# Patient Record
Sex: FEMALE | Race: WHITE | Employment: OTHER | URBAN - METROPOLITAN AREA
[De-identification: names, ages, dates, MRNs, and addresses within clinical notes are randomized per-mention and may not be internally consistent; named-entity substitution may affect disease eponyms.]

---

## 2021-12-25 ENCOUNTER — HOSPITAL ENCOUNTER (INPATIENT)
Age: 86
LOS: 4 days | Discharge: NURSING, REHAB OR LONG-TERM CARE | DRG: 312 | End: 2021-12-29
Attending: EMERGENCY MEDICINE | Admitting: INTERNAL MEDICINE
Payer: MEDICARE

## 2021-12-25 ENCOUNTER — APPOINTMENT (OUTPATIENT)
Dept: CT IMAGING | Age: 86
DRG: 312 | End: 2021-12-25
Payer: MEDICARE

## 2021-12-25 DIAGNOSIS — R55 SYNCOPE AND COLLAPSE: Primary | ICD-10-CM

## 2021-12-25 LAB
A/G RATIO: 1.4 (ref 1.1–2.2)
ALBUMIN SERPL-MCNC: 4.2 G/DL (ref 3.4–5)
ALP BLD-CCNC: 78 U/L (ref 40–129)
ALT SERPL-CCNC: 10 U/L (ref 10–40)
ANION GAP SERPL CALCULATED.3IONS-SCNC: 13 MMOL/L (ref 3–16)
AST SERPL-CCNC: 17 U/L (ref 15–37)
BASOPHILS ABSOLUTE: 0 K/UL (ref 0–0.2)
BASOPHILS RELATIVE PERCENT: 0.9 %
BILIRUB SERPL-MCNC: 0.3 MG/DL (ref 0–1)
BILIRUBIN URINE: NEGATIVE
BLOOD, URINE: NEGATIVE
BUN BLDV-MCNC: 20 MG/DL (ref 7–20)
CALCIUM SERPL-MCNC: 9.4 MG/DL (ref 8.3–10.6)
CHLORIDE BLD-SCNC: 94 MMOL/L (ref 99–110)
CLARITY: CLEAR
CO2: 24 MMOL/L (ref 21–32)
COLOR: YELLOW
CREAT SERPL-MCNC: 1.4 MG/DL (ref 0.6–1.2)
EOSINOPHILS ABSOLUTE: 0 K/UL (ref 0–0.6)
EOSINOPHILS RELATIVE PERCENT: 0.5 %
GFR AFRICAN AMERICAN: 42
GFR NON-AFRICAN AMERICAN: 35
GLUCOSE BLD-MCNC: 113 MG/DL (ref 70–99)
GLUCOSE URINE: NEGATIVE MG/DL
HCT VFR BLD CALC: 32.7 % (ref 36–48)
HEMOGLOBIN: 11 G/DL (ref 12–16)
KETONES, URINE: NEGATIVE MG/DL
LACTIC ACID, SEPSIS: 1.9 MMOL/L (ref 0.4–1.9)
LEUKOCYTE ESTERASE, URINE: NEGATIVE
LIPASE: 29 U/L (ref 13–60)
LYMPHOCYTES ABSOLUTE: 1.6 K/UL (ref 1–5.1)
LYMPHOCYTES RELATIVE PERCENT: 30.4 %
MCH RBC QN AUTO: 32.4 PG (ref 26–34)
MCHC RBC AUTO-ENTMCNC: 33.5 G/DL (ref 31–36)
MCV RBC AUTO: 96.6 FL (ref 80–100)
MICROSCOPIC EXAMINATION: NORMAL
MONOCYTES ABSOLUTE: 0.8 K/UL (ref 0–1.3)
MONOCYTES RELATIVE PERCENT: 15.2 %
NEUTROPHILS ABSOLUTE: 2.7 K/UL (ref 1.7–7.7)
NEUTROPHILS RELATIVE PERCENT: 53 %
NITRITE, URINE: NEGATIVE
PDW BLD-RTO: 12.6 % (ref 12.4–15.4)
PH UA: 6 (ref 5–8)
PLATELET # BLD: 314 K/UL (ref 135–450)
PMV BLD AUTO: 7.3 FL (ref 5–10.5)
POTASSIUM REFLEX MAGNESIUM: 4.2 MMOL/L (ref 3.5–5.1)
PROTEIN UA: NEGATIVE MG/DL
RBC # BLD: 3.39 M/UL (ref 4–5.2)
SARS-COV-2, NAAT: DETECTED
SODIUM BLD-SCNC: 131 MMOL/L (ref 136–145)
SPECIFIC GRAVITY UA: 1.01 (ref 1–1.03)
TOTAL PROTEIN: 7.2 G/DL (ref 6.4–8.2)
TROPONIN: 0.04 NG/ML
URINE REFLEX TO CULTURE: NORMAL
URINE TYPE: NORMAL
UROBILINOGEN, URINE: 0.2 E.U./DL
WBC # BLD: 5.1 K/UL (ref 4–11)

## 2021-12-25 PROCEDURE — 96374 THER/PROPH/DIAG INJ IV PUSH: CPT

## 2021-12-25 PROCEDURE — 36415 COLL VENOUS BLD VENIPUNCTURE: CPT

## 2021-12-25 PROCEDURE — 83605 ASSAY OF LACTIC ACID: CPT

## 2021-12-25 PROCEDURE — 2700000000 HC OXYGEN THERAPY PER DAY

## 2021-12-25 PROCEDURE — 1200000000 HC SEMI PRIVATE

## 2021-12-25 PROCEDURE — 94761 N-INVAS EAR/PLS OXIMETRY MLT: CPT

## 2021-12-25 PROCEDURE — 6360000002 HC RX W HCPCS: Performed by: EMERGENCY MEDICINE

## 2021-12-25 PROCEDURE — 83690 ASSAY OF LIPASE: CPT

## 2021-12-25 PROCEDURE — 84484 ASSAY OF TROPONIN QUANT: CPT

## 2021-12-25 PROCEDURE — 87635 SARS-COV-2 COVID-19 AMP PRB: CPT

## 2021-12-25 PROCEDURE — 6360000002 HC RX W HCPCS: Performed by: INTERNAL MEDICINE

## 2021-12-25 PROCEDURE — 81003 URINALYSIS AUTO W/O SCOPE: CPT

## 2021-12-25 PROCEDURE — 93005 ELECTROCARDIOGRAM TRACING: CPT | Performed by: EMERGENCY MEDICINE

## 2021-12-25 PROCEDURE — 74176 CT ABD & PELVIS W/O CONTRAST: CPT

## 2021-12-25 PROCEDURE — 85025 COMPLETE CBC W/AUTO DIFF WBC: CPT

## 2021-12-25 PROCEDURE — 80053 COMPREHEN METABOLIC PANEL: CPT

## 2021-12-25 PROCEDURE — 99283 EMERGENCY DEPT VISIT LOW MDM: CPT

## 2021-12-25 PROCEDURE — 2580000003 HC RX 258: Performed by: EMERGENCY MEDICINE

## 2021-12-25 PROCEDURE — 2580000003 HC RX 258: Performed by: INTERNAL MEDICINE

## 2021-12-25 RX ORDER — POLYETHYLENE GLYCOL 3350 17 G/17G
17 POWDER, FOR SOLUTION ORAL DAILY PRN
Status: DISCONTINUED | OUTPATIENT
Start: 2021-12-25 | End: 2021-12-29 | Stop reason: HOSPADM

## 2021-12-25 RX ORDER — ONDANSETRON 2 MG/ML
4 INJECTION INTRAMUSCULAR; INTRAVENOUS ONCE
Status: COMPLETED | OUTPATIENT
Start: 2021-12-25 | End: 2021-12-25

## 2021-12-25 RX ORDER — SODIUM CHLORIDE 9 MG/ML
25 INJECTION, SOLUTION INTRAVENOUS PRN
Status: DISCONTINUED | OUTPATIENT
Start: 2021-12-25 | End: 2021-12-29 | Stop reason: HOSPADM

## 2021-12-25 RX ORDER — ACETAMINOPHEN 650 MG/1
650 SUPPOSITORY RECTAL EVERY 6 HOURS PRN
Status: DISCONTINUED | OUTPATIENT
Start: 2021-12-25 | End: 2021-12-29 | Stop reason: HOSPADM

## 2021-12-25 RX ORDER — ONDANSETRON 2 MG/ML
4 INJECTION INTRAMUSCULAR; INTRAVENOUS EVERY 6 HOURS PRN
Status: DISCONTINUED | OUTPATIENT
Start: 2021-12-25 | End: 2021-12-29 | Stop reason: HOSPADM

## 2021-12-25 RX ORDER — ACETAMINOPHEN 325 MG/1
650 TABLET ORAL EVERY 6 HOURS PRN
Status: DISCONTINUED | OUTPATIENT
Start: 2021-12-25 | End: 2021-12-29 | Stop reason: HOSPADM

## 2021-12-25 RX ORDER — LORAZEPAM 2 MG/ML
0.5 INJECTION INTRAMUSCULAR ONCE
Status: COMPLETED | OUTPATIENT
Start: 2021-12-25 | End: 2021-12-25

## 2021-12-25 RX ORDER — SODIUM CHLORIDE 9 MG/ML
INJECTION, SOLUTION INTRAVENOUS CONTINUOUS
Status: ACTIVE | OUTPATIENT
Start: 2021-12-25 | End: 2021-12-27

## 2021-12-25 RX ORDER — SODIUM CHLORIDE 0.9 % (FLUSH) 0.9 %
5-40 SYRINGE (ML) INJECTION PRN
Status: DISCONTINUED | OUTPATIENT
Start: 2021-12-25 | End: 2021-12-29 | Stop reason: HOSPADM

## 2021-12-25 RX ORDER — ONDANSETRON 4 MG/1
4 TABLET, ORALLY DISINTEGRATING ORAL EVERY 8 HOURS PRN
Status: DISCONTINUED | OUTPATIENT
Start: 2021-12-25 | End: 2021-12-29 | Stop reason: HOSPADM

## 2021-12-25 RX ORDER — SODIUM CHLORIDE 0.9 % (FLUSH) 0.9 %
5-40 SYRINGE (ML) INJECTION EVERY 12 HOURS SCHEDULED
Status: DISCONTINUED | OUTPATIENT
Start: 2021-12-25 | End: 2021-12-29 | Stop reason: HOSPADM

## 2021-12-25 RX ORDER — 0.9 % SODIUM CHLORIDE 0.9 %
250 INTRAVENOUS SOLUTION INTRAVENOUS ONCE
Status: COMPLETED | OUTPATIENT
Start: 2021-12-25 | End: 2021-12-25

## 2021-12-25 RX ADMIN — LORAZEPAM 0.5 MG: 2 INJECTION INTRAMUSCULAR; INTRAVENOUS at 18:38

## 2021-12-25 RX ADMIN — SODIUM CHLORIDE 250 ML: 9 INJECTION, SOLUTION INTRAVENOUS at 14:31

## 2021-12-25 RX ADMIN — SODIUM CHLORIDE: 9 INJECTION, SOLUTION INTRAVENOUS at 18:42

## 2021-12-25 RX ADMIN — ONDANSETRON 4 MG: 2 INJECTION INTRAMUSCULAR; INTRAVENOUS at 14:42

## 2021-12-25 ASSESSMENT — PAIN SCALES - PAIN ASSESSMENT IN ADVANCED DEMENTIA (PAINAD)
NEGVOCALIZATION: 0
FACIALEXPRESSION: 0
BREATHING: 0
CONSOLABILITY: 0
TOTALSCORE: 0
BODYLANGUAGE: 0

## 2021-12-25 ASSESSMENT — PAIN SCALES - GENERAL
PAINLEVEL_OUTOF10: 0
PAINLEVEL_OUTOF10: 0

## 2021-12-25 ASSESSMENT — PAIN DESCRIPTION - PAIN TYPE: TYPE: ACUTE PAIN;CHRONIC PAIN

## 2021-12-25 ASSESSMENT — ENCOUNTER SYMPTOMS
NAUSEA: 1
VOMITING: 1

## 2021-12-25 NOTE — ED NOTES
Pt resting in bed at this time, laying in a supine position with head of bed elevated . Call light remains in reach instructed pt how to use, and encouraged pt to call if needed assistance, no distress noted. RR even and unlabored, skin warm and dry. No needs at this time. Will continue to monitor closely.          Muna Bella RN  12/25/21 1640

## 2021-12-25 NOTE — ED NOTES
Bed: A-16  Expected date:   Expected time:   Means of arrival: [facility]  Comments:  Syncope     Sherren Garb, RN  12/25/21 4984

## 2021-12-25 NOTE — ED NOTES
Pt resting in bed at this time, laying in a supine position with head of bed elevated . Call light remains in reach instructed pt how to use, and encouraged pt to call if needed assistance, no distress noted. RR even and unlabored pt desat to 87%, 3 liters nasal cannula placed on pt, ed md aware  skin warm and dry. No needs at this time. Will continue to monitor closely.          Yisel Carver RN  12/25/21 2029

## 2021-12-25 NOTE — ED NOTES
Pt arrived to the ED via EMS, pt was supine on stretcher with head elevated alert, pt responds to voice, pt was transferred to stretcher in room 16, pt placed on cardiac monitor, Dr Vicente Tadeo at bedside, per ems nursing home nurse stated that pt had a syncopal episode on the commode, pt had bm and vomit pt was cleansed vss afebrile,       Gabby Turner RN  12/25/21 8057

## 2021-12-25 NOTE — PROGRESS NOTES
Pt bed alarm going off, pt found standing aat side of the bed, pt is disoriented x4 at present. Pt unable to follow commands, RN attempted explaining the pt about her situation and needed to babe in the hospital. Pt just wound not listen and gets feisty. It took 30 mins and three nurses to get pt back in bed. Darcie care provided. Pt repositioned in bed. Spoke with pt's daughter  at [phone], per pt's daughter she takes ativan scheduled at nursing home and as needed. Pt family wants pt to have her ativan.  Will notify MD. Electronically signed by Jelani Babin RN on 12/25/2021 at 6:30 PM

## 2021-12-25 NOTE — ED NOTES
Report called to  [address] [place] [address]      RN   To Room   [id number]  Cardiac monitor on during transfer  Pt's pain level   denies  VSS, Afebrile   IV site is clean, dry and intact, Normal saline locked   Family updated on transfer            Nicolle Manzo RN  12/25/21 0842

## 2021-12-25 NOTE — PROGRESS NOTES
Pt arrived in the room from ED at 17:15, pt is disoriented x4. Pt wanted to be left alone. Pt is not cooperative for nurse to let do anything at present. RN was able to get vital signs after few mins. Pt resting in bed. Pt wanted to left alone. Pt in bed. Bed alarm on. Call light and item need in reach.  Electronically signed by Katherine Hoang RN on 12/25/2021 at 6:22 PM

## 2021-12-25 NOTE — ED PROVIDER NOTES
[facility]  eMERGENCY dEPARTMENT eNCOUnter      Pt Name: Martin Anne  MRN: [id number]  [place] 9/26/1928  Date of evaluation: 12/25/2021  Provider: Kalli Rivas MD    CHIEF COMPLAINT       Chief Complaint   Patient presents with    Loss of Consciousness         HISTORY OF PRESENT ILLNESS   (Location/Symptom, Timing/Onset,Context/Setting, Quality, Duration, Modifying Factors, Severity)  Note limiting factors. Martin Anne is a 80 y.o. female who presents to the emergency department with syncope. This patient comes from a [facility] facility. She reportedly was having a bowel movement on the toilet when she had a syncopal episode. Her syncope lasted between 2 to 10 minutes. EMS reports that she had more vomiting and the regular blood pressure declined and they diverted from [address] [place] [address] to the emergency department here at [place].  The patient just states she feels nauseated. She is unable to provide other history. She does state that she has no pain anywhere. HPI    NursingNotes were reviewed. REVIEW OF SYSTEMS    (2-9 systems for level 4, 10 or more for level 5)     Review of Systems   Unable to perform ROS: Dementia   Gastrointestinal: Positive for nausea and vomiting. Except as noted above the remainder of the review of systems was reviewed and negative. PAST MEDICAL HISTORY   No past medical history on file. SURGICALHISTORY     No past surgical history on file. CURRENT MEDICATIONS       Previous Medications    No medications on file       ALLERGIES     Patient has no known allergies. FAMILY HISTORY     No family history on file.        SOCIAL HISTORY       Social History     Socioeconomic History    Marital status:      Spouse name: Not on file    Number of children: Not on file    Years of education: Not on file    Highest education level: Not on file   Occupational History    Not on file   Tobacco Use    Smoking status: Not on file    Smokeless tobacco: Not on file   Substance and Sexual Activity    Alcohol use: Not on file    Drug use: Not on file    Sexual activity: Not on file   Other Topics Concern    Not on file   Social History Narrative    Not on file     Social Determinants of Health     Financial Resource Strain:     Difficulty of Paying Living Expenses: Not on file   Food Insecurity:     Worried About Running Out of Food in the Last Year: Not on file    Keyla of Food in the Last Year: Not on file   Transportation Needs:     Lack of Transportation (Medical): Not on file    Lack of Transportation (Non-Medical): Not on file   Physical Activity:     Days of Exercise per Week: Not on file    Minutes of Exercise per Session: Not on file   Stress:     Feeling of Stress : Not on file   Social Connections:     Frequency of Communication with Friends and Family: Not on file    Frequency of Social Gatherings with Friends and Family: Not on file    Attends Mandaeism Services: Not on file    Active Member of [address] [place] [address] [employer] or Organizations: Not on file    Attends Club or Organization Meetings: Not on file    Marital Status: Not on file   Intimate Partner Violence:     Fear of Current or Ex-Partner: Not on file    Emotionally Abused: Not on file    Physically Abused: Not on file    Sexually Abused: Not on file   Housing Stability:     Unable to Pay for Housing in the Last Year: Not on file    Number of [place] in the Last Year: Not on file    Unstable Housing in the Last Year: Not on file       SCREENINGS             PHYSICAL EXAM    (up to 7 for level 4, 8 or more for level 5)     ED Triage Vitals [12/25/21 1426]   BP Temp Temp Source Pulse Resp SpO2 Height Weight   (!) 98/43 98 °F (36.7 °C) Oral 59 22 92 % -- --       Physical Exam  Vitals and nursing note reviewed. Constitutional:       Appearance: She is well-developed. She is not diaphoretic.       Comments: Pale elderly female in no acute distress. HENT:      Head: Normocephalic and atraumatic. Nose: Nose normal.      Mouth/Throat:      Mouth: Mucous membranes are moist.   Eyes:      General:         Right eye: No discharge. Left eye: No discharge. Conjunctiva/sclera: Conjunctivae normal.   Cardiovascular:      Rate and Rhythm: Regular rhythm. Pulses: Normal pulses. Pulmonary:      Effort: Pulmonary effort is normal. No respiratory distress. Abdominal:      Palpations: Abdomen is soft. Tenderness: There is no abdominal tenderness. Comments: No pulsatile masses   Musculoskeletal:         General: Normal range of motion. Cervical back: Neck supple. Skin:     General: Skin is warm and dry. Capillary Refill: Capillary refill takes less than 2 seconds. Neurological:      Mental Status: She is alert and oriented to person, place, and time. Psychiatric:         Behavior: Behavior normal.         DIAGNOSTIC RESULTS     EKG: All EKG's are interpreted by the Emergency Department Physician who either signs or Co-signsthis chart in the absence of a cardiologist.    The Ekg interpreted by me shows  normal sinus rhythm with a rate of 63  Axis is   Normal  QTc is  normal  Right bundle branch block. ST Segments: T wave inversion in leads V1, V2. Nonspecific ST-T wave changes. There are no EKG images available in our epic instance for comparison. According to care everywhere on March 24, 2021 the patient had a right bundle branch block noted on her EKG at that time.           RADIOLOGY:   Non-plain filmimages such as CT, Ultrasound and MRI are read by the radiologist. Plain radiographic images are visualized and preliminarily interpreted by the emergency physician with the below findings:        Interpretation per the Radiologist below, if available at the time ofthis note:    No orders to display         ED BEDSIDE ULTRASOUND:   Performed by ED Physician - none    LABS:  Labs Reviewed   CBC WITH AUTO DIFFERENTIAL   COMPREHENSIVE METABOLIC PANEL W/ REFLEX TO MG FOR LOW K   LIPASE   TROPONIN   URINE RT REFLEX TO CULTURE   LACTATE, SEPSIS   LACTATE, SEPSIS       All other labs were within normal range or not returned as of this dictation. EMERGENCY DEPARTMENT COURSE and DIFFERENTIAL DIAGNOSIS/MDM:   Vitals:    Vitals:    12/25/21 1426   BP: (!) 98/43   Pulse: 59   Resp: 22   Temp: 98 °F (36.7 °C)   TempSrc: Oral   SpO2: 92%         MDM  Number of Diagnoses or Management Options  Diagnosis management comments: DDX: Non-ST elevation MI, hypovolemia, dehydration, sepsis, other    At 3:00 PM I turned this patient over to Dr. Becca Means. CRITICAL CARE TIME   Total Critical Care time was 0 minutes, excluding separately reportable procedures. There was a high probability of clinically significant/life threatening deterioration in the patient's condition which required my urgent intervention. CONSULTS:  None    PROCEDURES:  Unless otherwise noted below, none     Procedures    FINAL IMPRESSION    No diagnosis found. DISPOSITION/PLAN   DISPOSITION        PATIENT REFERRED TO:  No follow-up provider specified.     DISCHARGE MEDICATIONS:  New Prescriptions    No medications on file          (Please note that portions of this note were completed with a voice recognition program.Efforts were made to edit the dictations but occasionally words are mis-transcribed.)    Travis Stephenson MD (electronically signed)  Attending Emergency Physician         Travis Stephenson MD  12/26/21 5534

## 2021-12-26 LAB
ANION GAP SERPL CALCULATED.3IONS-SCNC: 9 MMOL/L (ref 3–16)
BASOPHILS ABSOLUTE: 0 K/UL (ref 0–0.2)
BASOPHILS RELATIVE PERCENT: 0.7 %
BUN BLDV-MCNC: 16 MG/DL (ref 7–20)
CALCIUM SERPL-MCNC: 8.6 MG/DL (ref 8.3–10.6)
CHLORIDE BLD-SCNC: 102 MMOL/L (ref 99–110)
CO2: 24 MMOL/L (ref 21–32)
CREAT SERPL-MCNC: 0.9 MG/DL (ref 0.6–1.2)
EKG ATRIAL RATE: 63 BPM
EKG DIAGNOSIS: NORMAL
EKG P AXIS: 93 DEGREES
EKG P-R INTERVAL: 160 MS
EKG Q-T INTERVAL: 440 MS
EKG QRS DURATION: 114 MS
EKG QTC CALCULATION (BAZETT): 450 MS
EKG R AXIS: 79 DEGREES
EKG T AXIS: 68 DEGREES
EKG VENTRICULAR RATE: 63 BPM
EOSINOPHILS ABSOLUTE: 0 K/UL (ref 0–0.6)
EOSINOPHILS RELATIVE PERCENT: 0.2 %
GFR AFRICAN AMERICAN: >60
GFR NON-AFRICAN AMERICAN: 58
GLUCOSE BLD-MCNC: 85 MG/DL (ref 70–99)
HCT VFR BLD CALC: 29.4 % (ref 36–48)
HEMOGLOBIN: 9.7 G/DL (ref 12–16)
LYMPHOCYTES ABSOLUTE: 1.3 K/UL (ref 1–5.1)
LYMPHOCYTES RELATIVE PERCENT: 27 %
MCH RBC QN AUTO: 32 PG (ref 26–34)
MCHC RBC AUTO-ENTMCNC: 33.2 G/DL (ref 31–36)
MCV RBC AUTO: 96.5 FL (ref 80–100)
MONOCYTES ABSOLUTE: 0.7 K/UL (ref 0–1.3)
MONOCYTES RELATIVE PERCENT: 15.1 %
NEUTROPHILS ABSOLUTE: 2.7 K/UL (ref 1.7–7.7)
NEUTROPHILS RELATIVE PERCENT: 57 %
PDW BLD-RTO: 12.5 % (ref 12.4–15.4)
PLATELET # BLD: 218 K/UL (ref 135–450)
PMV BLD AUTO: 7.3 FL (ref 5–10.5)
POTASSIUM REFLEX MAGNESIUM: 4.1 MMOL/L (ref 3.5–5.1)
RBC # BLD: 3.04 M/UL (ref 4–5.2)
SODIUM BLD-SCNC: 135 MMOL/L (ref 136–145)
TROPONIN: 0.02 NG/ML
WBC # BLD: 4.7 K/UL (ref 4–11)

## 2021-12-26 PROCEDURE — 1200000000 HC SEMI PRIVATE

## 2021-12-26 PROCEDURE — 80048 BASIC METABOLIC PNL TOTAL CA: CPT

## 2021-12-26 PROCEDURE — U0005 INFEC AGEN DETEC AMPLI PROBE: HCPCS

## 2021-12-26 PROCEDURE — 6360000002 HC RX W HCPCS: Performed by: INTERNAL MEDICINE

## 2021-12-26 PROCEDURE — 6370000000 HC RX 637 (ALT 250 FOR IP): Performed by: INTERNAL MEDICINE

## 2021-12-26 PROCEDURE — 85025 COMPLETE CBC W/AUTO DIFF WBC: CPT

## 2021-12-26 PROCEDURE — 97162 PT EVAL MOD COMPLEX 30 MIN: CPT

## 2021-12-26 PROCEDURE — 97530 THERAPEUTIC ACTIVITIES: CPT

## 2021-12-26 PROCEDURE — U0003 INFECTIOUS AGENT DETECTION BY NUCLEIC ACID (DNA OR RNA); SEVERE ACUTE RESPIRATORY SYNDROME CORONAVIRUS 2 (SARS-COV-2) (CORONAVIRUS DISEASE [COVID-19]), AMPLIFIED PROBE TECHNIQUE, MAKING USE OF HIGH THROUGHPUT TECHNOLOGIES AS DESCRIBED BY CMS-2020-01-R: HCPCS

## 2021-12-26 PROCEDURE — 2580000003 HC RX 258: Performed by: INTERNAL MEDICINE

## 2021-12-26 PROCEDURE — 93010 ELECTROCARDIOGRAM REPORT: CPT | Performed by: INTERNAL MEDICINE

## 2021-12-26 PROCEDURE — 36415 COLL VENOUS BLD VENIPUNCTURE: CPT

## 2021-12-26 PROCEDURE — 84484 ASSAY OF TROPONIN QUANT: CPT

## 2021-12-26 RX ORDER — LORAZEPAM 0.5 MG/1
0.25 TABLET ORAL EVERY 6 HOURS PRN
Status: DISCONTINUED | OUTPATIENT
Start: 2021-12-26 | End: 2021-12-29 | Stop reason: HOSPADM

## 2021-12-26 RX ORDER — LISINOPRIL 10 MG/1
10 TABLET ORAL DAILY
Status: DISCONTINUED | OUTPATIENT
Start: 2021-12-26 | End: 2021-12-29 | Stop reason: HOSPADM

## 2021-12-26 RX ORDER — LORAZEPAM 0.5 MG/1
0.5 TABLET ORAL EVERY 6 HOURS PRN
COMMUNITY

## 2021-12-26 RX ORDER — LORAZEPAM 0.5 MG/1
0.5 TABLET ORAL DAILY
COMMUNITY

## 2021-12-26 RX ORDER — LORAZEPAM 0.5 MG/1
0.5 TABLET ORAL DAILY
Status: DISCONTINUED | OUTPATIENT
Start: 2021-12-26 | End: 2021-12-29 | Stop reason: HOSPADM

## 2021-12-26 RX ORDER — LISINOPRIL 5 MG/1
10 TABLET ORAL DAILY
Status: ON HOLD | COMMUNITY
Start: 2021-12-19 | End: 2021-12-26

## 2021-12-26 RX ORDER — LISINOPRIL 5 MG/1
5 TABLET ORAL DAILY
COMMUNITY

## 2021-12-26 RX ADMIN — METOPROLOL TARTRATE 25 MG: 25 TABLET, FILM COATED ORAL at 10:26

## 2021-12-26 RX ADMIN — SERTRALINE 50 MG: 50 TABLET, FILM COATED ORAL at 10:26

## 2021-12-26 RX ADMIN — LORAZEPAM 0.5 MG: 0.5 TABLET ORAL at 11:46

## 2021-12-26 RX ADMIN — METOPROLOL TARTRATE 25 MG: 25 TABLET, FILM COATED ORAL at 18:30

## 2021-12-26 RX ADMIN — LISINOPRIL 10 MG: 10 TABLET ORAL at 10:26

## 2021-12-26 RX ADMIN — LORAZEPAM 0.25 MG: 0.5 TABLET ORAL at 18:30

## 2021-12-26 RX ADMIN — SODIUM CHLORIDE: 9 INJECTION, SOLUTION INTRAVENOUS at 20:03

## 2021-12-26 RX ADMIN — ENOXAPARIN SODIUM 40 MG: 100 INJECTION SUBCUTANEOUS at 08:13

## 2021-12-26 ASSESSMENT — PAIN SCALES - GENERAL
PAINLEVEL_OUTOF10: 0

## 2021-12-26 ASSESSMENT — PAIN SCALES - PAIN ASSESSMENT IN ADVANCED DEMENTIA (PAINAD)
BREATHING: 0
TOTALSCORE: 0
CONSOLABILITY: 0
FACIALEXPRESSION: 0
FACIALEXPRESSION: 0
BODYLANGUAGE: 0
BREATHING: 0
FACIALEXPRESSION: 0
CONSOLABILITY: 0
NEGVOCALIZATION: 0
BREATHING: 0
TOTALSCORE: 0
BREATHING: 0
NEGVOCALIZATION: 0
BODYLANGUAGE: 0
NEGVOCALIZATION: 0
BREATHING: 0
FACIALEXPRESSION: 0
BODYLANGUAGE: 0
NEGVOCALIZATION: 0
FACIALEXPRESSION: 0
BODYLANGUAGE: 0
BODYLANGUAGE: 0
NEGVOCALIZATION: 0
TOTALSCORE: 0
CONSOLABILITY: 0
FACIALEXPRESSION: 0
BODYLANGUAGE: 0
CONSOLABILITY: 0
TOTALSCORE: 0
CONSOLABILITY: 0
BODYLANGUAGE: 0
BREATHING: 0
BODYLANGUAGE: 0
BREATHING: 0
NEGVOCALIZATION: 0
TOTALSCORE: 0
TOTALSCORE: 0
CONSOLABILITY: 0
NEGVOCALIZATION: 0
NEGVOCALIZATION: 0
TOTALSCORE: 0
FACIALEXPRESSION: 0
CONSOLABILITY: 0
FACIALEXPRESSION: 0
TOTALSCORE: 0
CONSOLABILITY: 0
BREATHING: 0

## 2021-12-26 ASSESSMENT — PAIN DESCRIPTION - PAIN TYPE
TYPE: ACUTE PAIN
TYPE: ACUTE PAIN

## 2021-12-26 NOTE — PROGRESS NOTES
Update provided to pt's daughter Mesha Hardwick.  Electronically signed by Eduar Christie RN on 12/26/2021 at 8:33 AM

## 2021-12-26 NOTE — PROGRESS NOTES
4 Eyes Skin Assessment     NAME:  Antonietta Arredondo  YOB: 1928  MEDICAL RECORD NUMBER:  [id number]    The patient is being assess for  Admission    I agree that 2 RN's have performed a thorough Head to Toe Skin Assessment on the patient. ALL assessment sites listed below have been assessed. Areas assessed by both nurses:    Head, Face, Ears, Shoulders, Back, Chest, Arms, Elbows, Hands, Sacrum. Buttock, Coccyx, Ischium and Legs. Feet and Heels        Does the Patient have a Wound?  No noted wound(s)       Cecil Prevention initiated:  Yes   Wound Care Orders initiated:  No    Pressure Injury (Stage 3,4, Unstageable, DTI, NWPT, and Complex wounds) if present place consult order under [de-identified] No    New and Established Ostomies if present place consult order under : No      Nurse 1 eSignature: Electronically signed by Lindsey Centeno RN on 12/25/21 at 7:58 PM EST    **SHARE this note so that the co-signing nurse is able to place an eSignature**    Nurse 2 eSignature: Electronically signed by Lindsey Centeno RN on 12/25/21 at 7:58 PM EST Tam Bro RN 12/25/21 at 8:00 PM EST       '

## 2021-12-26 NOTE — PROGRESS NOTES
Pt medicated with prn ativan as ordered.  Electronically signed by Bozena Norman RN on 12/25/2021 at 7:16 PM

## 2021-12-26 NOTE — PLAN OF CARE
Problem: Airway Clearance - Ineffective  Goal: Achieve or maintain patent airway  Outcome: Ongoing     Problem: Gas Exchange - Impaired  Goal: Absence of hypoxia  Outcome: Ongoing     Problem: Breathing Pattern - Ineffective  Goal: Ability to achieve and maintain a regular respiratory rate  Outcome: Ongoing     Problem: Body Temperature -  Risk of, Imbalanced  Goal: Ability to maintain a body temperature within defined limits  Outcome: Ongoing     Problem:  Body Temperature -  Risk of, Imbalanced  Goal: Will regain or maintain usual level of consciousness  Outcome: Ongoing     Problem: Isolation Precautions - Risk of Spread of Infection  Goal: Prevent transmission of infection  Outcome: Ongoing     Problem: Nutrition Deficits  Goal: Optimize nutritional status  Outcome: Ongoing     Problem: Risk for Fluid Volume Deficit  Goal: Maintain normal heart rhythm  Outcome: Ongoing     Problem: Fatigue  Goal: Verbalize increase energy and improved vitality  Outcome: Ongoing     Problem: Discharge Planning:  Goal: Ability to perform activities of daily living will improve  Description: Ability to perform activities of daily living will improve  Outcome: Ongoing  Goal: Participates in care planning  Description: Participates in care planning  Outcome: Ongoing     Problem: Mood - Altered:  Goal: Mood stable  Description: Mood stable  Outcome: Ongoing     Problem: Psychomotor Activity - Altered:  Goal: Absence of psychomotor disturbance signs and symptoms  Description: Absence of psychomotor disturbance signs and symptoms  Outcome: Ongoing

## 2021-12-26 NOTE — PROGRESS NOTES
Physical Therapy    Facility/Department: [address] MED SURG  Initial Assessment    NAME: Simon Thomas  : 1928  MRN: [id number]    Date of Service: 2021    Discharge Recommendations:  Continue to assess pending progress   PT Equipment Recommendations  Other: Defer to facility. Assessment   Body structures, Functions, Activity limitations: Decreased functional mobility ; Decreased safe awareness;Decreased cognition  Assessment: 81 y/o female admit from nursing facility with Syncope/Collapse, LETY; COVID +. PMH including Dementia. Pt admit from nursing facility; unsure level of assist/functional activity although does appear most probably ambulatory. Pt currently very confused and unable to safely initiate eob/oob activities. Anticipate return to facility; will monitor pt's progress. Prognosis: Fair  Decision Making: Medium Complexity  History: 81 y/o female admit from nursing facility with Syncope/Collapse, LETY; COVID +. PMH including Dementia. Exam: See above. Clinical Presentation: See above. Patient Education: Role of PT, POC, Need to call for assist.  Barriers to Learning: Cognitive. REQUIRES PT FOLLOW UP: Yes  Activity Tolerance  Activity Tolerance: Patient limited by cognitive status       Patient Diagnosis(es): There were no encounter diagnoses. has no past medical history on file. has no past surgical history on file. Restrictions  Restrictions/Precautions  Restrictions/Precautions: Fall Risk,Isolation  Position Activity Restriction  Other position/activity restrictions: Droplet Plus : COVID +. Room Air. Vision/Hearing  Vision: Within Functional Limits  Hearing: Exceptions to [facility]  Hearing Exceptions: Hard of hearing/hearing concerns     Subjective  General  Chart Reviewed: Yes  Patient assessed for rehabilitation services?: Yes  Additional Pertinent Hx: 81 y/o female admit from nursing facility with Syncope/Collapse, LETY; COVID +. PMH including Dementia.   Family / Caregiver Present: No  Referring Practitioner: Dr. Nuha Thorne  Diagnosis: Syncope/Collapse, LETY; COVID +. Other (Comment): Pt attempt to follow simple commands; delayed/inconisistent. Subjective  Subjective: Pt appears confused; cont to ask \"why am I here; who brought me. Can I speak to the people. \"  Pain Screening  Patient Currently in Pain: Denies          Orientation  Orientation  Overall Orientation Status: Impaired  Orientation Level: Oriented to person;Disoriented to place; Disoriented to time;Disoriented to situation  Social/Functional History  Social/Functional History  Type of Home: Facility  Additional Comments: Pt unable to give any information re level of mobility or care needed. Nursing reports pt amb pta. Cognition   Cognition  Cognition Comment: Pt alert to self only; follows simple commands inconsistently/delayed. Objective          AROM RLE (degrees)  RLE AROM: WFL  AROM LLE (degrees)  LLE AROM : WFL  AROM RUE (degrees)  RUE AROM : WFL  AROM LUE (degrees)  LUE AROM : WFL  Strength Other  Other: At least  3+/5; unable to fully assess due to cognition. Bed mobility  Comment: Pt able to assist Rolling R/L for care and repositioning ; unable to safely initiate EOB/OOB at this time. Transfers  Bed to Chair: Unable to assess                 Plan   Plan  Times per week: 3-5x week while in acute care setting. Current Treatment Recommendations: Strengthening,Functional Mobility Training,Transfer Training,Gait Training,Safety Education & Training,Patient/Caregiver Education & Training  Safety Devices  Type of devices: Bed alarm in place,Call light within reach,Left in bed,Nurse notified  Restraints  Initially in place: Yes  Restraints: UE Soft Restraints.     AM-PAC Score  AM-PAC Inpatient Mobility Raw Score : 10 (12/26/21 1206)  AM-PAC Inpatient T-Scale Score : 32.29 (12/26/21 1206)  Mobility Inpatient CMS 0-100% Score: 76.75 (12/26/21 1206)  Mobility Inpatient CMS G-Code Modifier : CL (12/26/21

## 2021-12-26 NOTE — PLAN OF CARE
Problem: Gas Exchange - Impaired  Goal: Absence of hypoxia  Outcome: Ongoing  Goal: Promote optimal lung function  Outcome: Ongoing     Problem: Breathing Pattern - Ineffective  Goal: Ability to achieve and maintain a regular respiratory rate  Outcome: Ongoing     Problem:  Body Temperature -  Risk of, Imbalanced  Goal: Ability to maintain a body temperature within defined limits  Outcome: Ongoing  Goal: Will regain or maintain usual level of consciousness  Outcome: Ongoing  Goal: Complications related to the disease process, condition or treatment will be avoided or minimized  Outcome: Ongoing     Problem: Isolation Precautions - Risk of Spread of Infection  Goal: Prevent transmission of infection  Outcome: Ongoing

## 2021-12-26 NOTE — PROGRESS NOTES
Pt awake in bed, confused. Pt alert to person and place at present. Pt assisted with breakfast feed. Pt denies any pain. Pt continue to remain in donny soft wrist restraints. Bed alarm active. Call light and item need in reach.  Electronically signed by Alena Madden RN on 12/26/2021 at 8:25 AM

## 2021-12-26 NOTE — H&P
Hospital Medicine History & Physical      PCP: [facility]    Date of Admission: 12/25/2021    Date of Service: Pt seen/examined on 12/25/2021    Chief Complaint:      Chief Complaint   Patient presents with    Loss of Consciousness       History Of Present Illness:     70-year-old female with past medical history of dementia presented from a nursing facility with syncopal episode. Apparently patient was having a bowel movement on the toilet when she had a syncopal episode. She was found to be unconscious for a few minutes. Patient is unable to provide any history given her dementia. On arrival to the hospital patient was noted to be mildly hypotensive. Lab work showed elevated creatinine hyponatremia. Patient's Covid test came back positive. She had a CT abdomen and pelvis performed which showed no acute findings. Given her syncopal episode patient was admitted to the hospital for the work-up and management. Past Medical History:    No past medical history on file. Past Surgical History:    No past surgical history on file. Medications Prior to Admission:    Prior to Admission medications    Not on File       Allergies:  Patient has no known allergies. Social History:           Family History:  Reviewed in detail and negative for DM, Early CAD, Cancer, CVA. No family history on file. REVIEW OF SYSTEMS:   Positive review  noted in the HPI. All other systems reviewed and negative.     PHYSICAL EXAM:    BP (!) 102/47   Pulse 78   Temp 98.5 °F (36.9 °C) (Oral)   Resp 16   Wt 131 lb 6.3 oz (59.6 kg)   SpO2 99%   General Appearance: Alert, no acute distress  Skin: warm and dry, no rash or erythema  Head: normocephalic and atraumatic  Eyes: pupils equal, round, and reactive to light, extraocular eye movements intact, conjunctivae normal  ENT: tympanic membrane, external ear and ear canal normal bilaterally, nose without deformity, nasal mucosa and turbinates normal without in the last 72 hours. No results for input(s): FUNCXBLD in the last 72 hours. CSF Culture:  No results for input(s): COLORCSF, APPEARCSF, CFTUBE, CLOTCSF, WBCCSF, RBCCSF, NEUTCSF, NUMCELLSCSF, LYMPHSCSF, MONOCSF, GLUCCSF, VOLCSF in the last 72 hours. Respiratory Culture:  No results for input(s): Karishma Medicus in the last 72 hours. AFB:No results for input(s): AFBSMEAR in the last 72 hours. Urine Culture  No results for input(s): LABURIN in the last 72 hours. RADIOLOGY:    CT ABDOMEN PELVIS WO CONTRAST Additional Contrast? None   Final Result   1. Increased fluid in the stomach ,small bowel and colon, correlate for acute   gastro enterocolitis. No abscess or free air noted. No bowel obstruction. 2. Multiple left renal simple cysts. RECOMMENDATIONS:   Unavailable             Previous medical records personally reviewed and analyzed         PHYSICIAN CERTIFICATION    I certify that Samantha Canada is expected to be hospitalized for >2 midnights based on the following assessment and plan:    ASSESSMENT/PLAN:  Active Hospital Problems    Diagnosis Date Noted    Syncope and collapse [R55] 12/25/2021     Syncopal episode  Likely vasovagal versus orthostatic  Continue IV fluids  Check orthostatic vital signs    Acute kidney injury likely prerenal  Baseline creatinine is around 1  Continue IV fluids     Elevated troponin  Will trend    Nausea and vomiting  Continue symptomatic management Next    Dementia  Continue to monitor      DVT Prophylaxis: Lovenox  Diet: ADULT DIET; Regular  Code Status: DNR-CCA  PT/OT Eval Status: Consulted    Dispo -pending clinical course       Ryan Garcia MD  The note was completed using EMR. Every effort was made to ensure accuracy; however, inadvertent computerized transcription errors may be present. Thank you [facility] for the opportunity to be involved in this patient's care.  If you have any questions or concerns please feel free to contact me at [phone].

## 2021-12-26 NOTE — PROGRESS NOTES
Pt is confused, pt asking why she is here , where is her family. Pt reoriented to her situation. Pt wanted to speak with family. Pt is talking to her daughter at this time.  Electronically signed by Donovan Duarte RN on 12/26/2021 at 11:36 AM

## 2021-12-26 NOTE — PROGRESS NOTES
Hospitalist Progress Note      PCP: Jovany SPEARS    Date of Admission: 12/25/2021        Subjective: Is at bedside, patient denies chest pain shortness of breath nausea or vomiting. Medications:  Reviewed    Infusion Medications    sodium chloride      sodium chloride 75 mL/hr at 12/26/21 0814     Scheduled Medications    sodium chloride flush  5-40 mL IntraVENous 2 times per day    enoxaparin  40 mg SubCUTAneous Daily     PRN Meds: sodium chloride flush, sodium chloride, ondansetron **OR** ondansetron, polyethylene glycol, acetaminophen **OR** acetaminophen      Intake/Output Summary (Last 24 hours) at 12/26/2021 0941  Last data filed at 12/26/2021 0814  Gross per 24 hour   Intake 1607.62 ml   Output    Net 1607.62 ml       Physical Exam Performed:    BP (!) 154/65   Pulse 63   Temp 97.4 °F (36.3 °C) (Oral)   Resp 18   Wt 123 lb 10.9 oz (56.1 kg)   SpO2 100%     General appearance: No apparent distress  Neck: Supple  Respiratory:  Normal respiratory effort. Clear to auscultation, bilaterally without Rales/Wheezes/Rhonchi. Cardiovascular: Regular rate and rhythm with normal S1/S2 without murmurs, rubs or gallops. Abdomen: Soft, non-tender, non-distended   Musculoskeletal: No clubbing, cyanosis  Skin: Skin color, texture, turgor normal.  No rashes or lesions. Neurologic: Moving her extremities  Psychiatric: Awake  Capillary Refill: Brisk,3 seconds, normal   Peripheral Pulses: +2 palpable, equal bilaterally       Labs:   Recent Labs     12/25/21  1417 12/26/21  0812   WBC 5.1 4.7   HGB 11.0* 9.7*   HCT 32.7* 29.4*    218     Recent Labs     12/25/21  1417 12/26/21  0812   * 135*   K 4.2 4.1   CL 94* 102   CO2 24 24   BUN 20 16   CREATININE 1.4* 0.9   CALCIUM 9.4 8.6     Recent Labs     12/25/21  1417   AST 17   ALT 10   BILITOT 0.3   ALKPHOS 78     No results for input(s): INR in the last 72 hours.   Recent Labs     12/25/21  1417 12/26/21  0812   TROPONINI 0.04* 0.02* Urinalysis:      Lab Results   Component Value Date    NITRU Negative 12/25/2021    BLOODU Negative 12/25/2021    SPECGRAV 1.010 12/25/2021    GLUCOSEU Negative 12/25/2021       Radiology:  CT ABDOMEN PELVIS WO CONTRAST Additional Contrast? None   Final Result   1. Increased fluid in the stomach ,small bowel and colon, correlate for acute   gastro enterocolitis. No abscess or free air noted. No bowel obstruction. 2. Multiple left renal simple cysts. RECOMMENDATIONS:   Unavailable                 Assessment/Plan:    Active Hospital Problems    Diagnosis     Syncope and collapse [R55]      1. Syncope and collapse, likely vasovagal reaction, telemetry monitor, monitor closely. Will get echo. 2.  Acute kidney injury, can contribute to presentation, on IV fluid currently. Creatinine improved from 1.4-0.9.  3.  Mild elevated troponin, trend for now  4. Nausea and vomiting, improved  5. COVID-19 infection, monitor closely supportive measures apparently she was for very short term reading hypoxic in the ED but thereafter she was not in need of any oxygen, discussed with nurse, will continue to monitor currently she is on room air, I will hold on starting steroids at this time. 6.  Anemia, appears chronic, no signs of bleeding, follow-up as outpatient  7. Mild hyponatremia, likely hypovolemic, improved. Diet: ADULT DIET;  Regular  Code Status: DNR-CCA        Mariano Rushing MD

## 2021-12-26 NOTE — PROGRESS NOTES
Pt bed alarm going off, RN walked in the pt's room, pt is very confused and not able to follow directions. Pt assisted back in bed. Bed alarm on. Spoke with MD earlier, Per MD-okay to use donny soft wrist restraints if ativan not effective. See new orders .  Electronically signed by Katherine Hoang RN on 12/25/2021 at 7:42 PM

## 2021-12-27 LAB
LV EF: 53 %
LVEF MODALITY: NORMAL
SARS-COV-2: DETECTED

## 2021-12-27 PROCEDURE — 93306 TTE W/DOPPLER COMPLETE: CPT

## 2021-12-27 PROCEDURE — 97530 THERAPEUTIC ACTIVITIES: CPT

## 2021-12-27 PROCEDURE — 97535 SELF CARE MNGMENT TRAINING: CPT

## 2021-12-27 PROCEDURE — 97166 OT EVAL MOD COMPLEX 45 MIN: CPT

## 2021-12-27 PROCEDURE — 2580000003 HC RX 258: Performed by: INTERNAL MEDICINE

## 2021-12-27 PROCEDURE — 6370000000 HC RX 637 (ALT 250 FOR IP): Performed by: INTERNAL MEDICINE

## 2021-12-27 PROCEDURE — 6360000002 HC RX W HCPCS: Performed by: INTERNAL MEDICINE

## 2021-12-27 PROCEDURE — 1200000000 HC SEMI PRIVATE

## 2021-12-27 RX ORDER — DEXAMETHASONE 6 MG/1
6 TABLET ORAL DAILY
Status: DISCONTINUED | OUTPATIENT
Start: 2021-12-27 | End: 2021-12-29 | Stop reason: HOSPADM

## 2021-12-27 RX ADMIN — METOPROLOL TARTRATE 25 MG: 25 TABLET, FILM COATED ORAL at 10:24

## 2021-12-27 RX ADMIN — LISINOPRIL 10 MG: 10 TABLET ORAL at 09:11

## 2021-12-27 RX ADMIN — SODIUM CHLORIDE, PRESERVATIVE FREE 10 ML: 5 INJECTION INTRAVENOUS at 09:12

## 2021-12-27 RX ADMIN — METOPROLOL TARTRATE 25 MG: 25 TABLET, FILM COATED ORAL at 17:53

## 2021-12-27 RX ADMIN — SODIUM CHLORIDE, PRESERVATIVE FREE 10 ML: 5 INJECTION INTRAVENOUS at 20:50

## 2021-12-27 RX ADMIN — DEXAMETHASONE 6 MG: 6 TABLET ORAL at 15:30

## 2021-12-27 RX ADMIN — ENOXAPARIN SODIUM 40 MG: 100 INJECTION SUBCUTANEOUS at 09:11

## 2021-12-27 RX ADMIN — SERTRALINE 50 MG: 50 TABLET, FILM COATED ORAL at 09:11

## 2021-12-27 RX ADMIN — LORAZEPAM 0.5 MG: 0.5 TABLET ORAL at 15:30

## 2021-12-27 ASSESSMENT — PAIN SCALES - GENERAL
PAINLEVEL_OUTOF10: 0

## 2021-12-27 ASSESSMENT — PAIN DESCRIPTION - PAIN TYPE: TYPE: ACUTE PAIN

## 2021-12-27 NOTE — PROGRESS NOTES
Physical Therapy  Facility/Department: [address] MED SURG  Daily Treatment Note  NAME: Cynthia Woods  : 1928  MRN: [id number]    Date of Service: 2021    Discharge Recommendations:  Patient would benefit from continued therapy after discharge,3-5 sessions per week   PT Equipment Recommendations  Other: Defer to facility. Cynthia Woods scored a 9/ on the AM-PAC short mobility form. Current research shows that an AM-PAC score of 17 or less is typically not associated with a discharge to the patient's home setting. Based on the patient's AM-PAC score and their current functional mobility deficits, it is recommended that the patient have 3-5 sessions per week of Physical Therapy at d/c to increase the patient's independence. Please see assessment section for further patient specific details. If patient discharges prior to next session this note will serve as a discharge summary. Please see below for the latest assessment towards goals. Assessment   Body structures, Functions, Activity limitations: Decreased functional mobility ; Decreased safe awareness;Decreased cognition  Assessment: 79 y/o female admit from nursing facility with Syncope/Collapse, LETY; COVID +. PMH including Dementia. Pt admit from nursing facility; unsure of most recent mobility level - however was living along in [facility] setting with assist from family in 2021 per Shannan. Pt currently requiring assist of two person for mobility. Recommend continued skilled therapy 3-5x/week to maximize independence and safety with functional mobility. Prognosis: Fair  Decision Making: Medium Complexity  History: see below  Exam: see below  Clinical Presentation: evolving  PT Education: PT Role;Plan of Care;General Safety;Orientation; Functional Mobility Training  Barriers to Learning: Cognitive.   REQUIRES PT FOLLOW UP: Yes  Activity Tolerance  Activity Tolerance: Patient limited by cognitive status     Patient Diagnosis(es): There were no encounter diagnoses. has no past medical history on file. has no past surgical history on file. Restrictions  Restrictions/Precautions  Restrictions/Precautions: Fall Risk,Isolation  Position Activity Restriction  Other position/activity restrictions: Droplet Plus : COVID +.  2L O2 in supine, room air when seated upright. Subjective   General  Chart Reviewed: Yes  Additional Pertinent Hx: 81 y/o female admit from nursing facility with Syncope/Collapse, LETY; COVID +. PMH including Dementia. Response To Previous Treatment: Patient unable to report, no changes reported from family or staff  Family / Caregiver Present: No  Referring Practitioner: Dr. Sivan Hartman: Pt is agreeable to PT - repeatedly asks the same questions throughout session. Orientation  Orientation  Overall Orientation Status: Impaired  Orientation Level: Oriented to person;Disoriented to place; Disoriented to time;Disoriented to situation     Cognition   Cognition  Overall Cognitive Status: Exceptions  Arousal/Alertness: Appropriate responses to stimuli  Following Commands: Follows one step commands with repetition  Attention Span: Attends with cues to redirect  Memory: Decreased short term memory;Decreased long term memory  Safety Judgement: Decreased awareness of need for safety;Decreased awareness of need for assistance  Problem Solving: Decreased awareness of errors  Insights: Not aware of deficits  Initiation: Requires cues for all  Sequencing: Requires cues for all     Objective   Bed mobility  Supine to Sit: Maximum assistance;2 Person assistance  Sit to Supine: Maximum assistance;2 Person assistance  Comment: Pt sat to EOB ~ 30 minutes. Initially rigid with heavy posterior lean but was able to transition to good statici and dyanmic sitting balance. Pt ate majority of breakfast. O2 sats at 99% on 2L O2, 94% on RA.  Pt desaturated to 87% when returned to supine and 2L placed back on pt. Transfers  Sit to Stand: Unable to assess  Stand to sit: Unable to assess  Ambulation  Ambulation?: No     Balance  Posture: Good  Sitting - Static: Good  Sitting - Dynamic: Good           AM-PAC Score  AM-PAC Inpatient Mobility Raw Score : 9 (12/27/21 0926)  AM-PAC Inpatient T-Scale Score : 30.55 (12/27/21 0926)  Mobility Inpatient CMS 0-100% Score: 81.38 (12/27/21 0926)  Mobility Inpatient CMS G-Code Modifier : CM (12/27/21 1640)          Goals  Short term goals  Time Frame for Short term goals: Upon d/c acute care setting. - all goals ongoing as of 12/27/21  Short term goal 1: Bed Mob CGA. Short term goal 2: Transfers with/without assist device CGA. Short term goal 3: Amb with/without assist device 25' CGA/MIn assist.  Patient Goals   Patient goals : None stated. Plan    Plan  Times per week: 3-5x/week  Current Treatment Recommendations: Strengthening,Functional Mobility Training,Transfer [employer] Training,Safety Education & Training,Patient/Caregiver Education & Training,Cognitive Reorientation,Neuromuscular Re-education,Balance Training,Endurance Training  Safety Devices  Type of devices: All fall risk precautions in place,Call light within reach,Gait belt,Patient at risk for falls,Left in bed,Bed alarm in place  Restraints  Initially in place: Yes  Restraints: UE Soft Restraints.      Therapy Time   Individual Concurrent Group Co-treatment   Time In 0820         Time Out 0920         Minutes 60         Timed Code Treatment Minutes: 60 Minutes       Faisal Pan PT

## 2021-12-27 NOTE — PLAN OF CARE
Problem: Airway Clearance - Ineffective  Goal: Achieve or maintain patent airway  12/27/2021 0207 by Brandan Wade RN  Outcome: Ongoing  12/26/2021 1507 by Henry Santiago RN  Outcome: Ongoing     Problem: Gas Exchange - Impaired  Goal: Absence of hypoxia  12/27/2021 0207 by Brandan Wade RN  Outcome: Ongoing  12/26/2021 1507 by Henry Santiago RN  Outcome: Ongoing  Goal: Promote optimal lung function  12/27/2021 0207 by Brandan Wade RN  Outcome: Ongoing  12/26/2021 1507 by Henry Santiago RN  Outcome: Ongoing     Problem: Breathing Pattern - Ineffective  Goal: Ability to achieve and maintain a regular respiratory rate  12/27/2021 0207 by Brandan Wade RN  Outcome: Ongoing  12/26/2021 1507 by Henry Santiago RN  Outcome: Ongoing     Problem:  Body Temperature -  Risk of, Imbalanced  Goal: Ability to maintain a body temperature within defined limits  12/27/2021 0207 by Brandan Wade RN  Outcome: Ongoing  12/26/2021 1507 by Henry Santiago RN  Outcome: Ongoing  Goal: Will regain or maintain usual level of consciousness  12/27/2021 0207 by Brandan Wade RN  Outcome: Ongoing  12/26/2021 1507 by Henry Santiago RN  Outcome: Ongoing  Goal: Complications related to the disease process, condition or treatment will be avoided or minimized  12/27/2021 0207 by Brandan Wade RN  Outcome: Ongoing  12/26/2021 1507 by Henry Santiago RN  Outcome: Ongoing     Problem: Isolation Precautions - Risk of Spread of Infection  Goal: Prevent transmission of infection  12/27/2021 0207 by Brandan Wade RN  Outcome: Ongoing  12/26/2021 1507 by Henry Santiago RN  Outcome: Ongoing     Problem: Nutrition Deficits  Goal: Optimize nutritional status  12/27/2021 0207 by Brandan Wade RN  Outcome: Ongoing  12/26/2021 1507 by Henry Santiago RN  Outcome: Ongoing     Problem: Nutrition Deficits  Goal: Optimize nutritional status  12/27/2021 0207 by Brandan Wade RN  Outcome: Ongoing  12/26/2021 1507 by Wellington Gallegos RN  Outcome: Ongoing     Problem: Fatigue  Goal: Verbalize increase energy and improved vitality  12/27/2021 0207 by Eugene Madden RN  Outcome: Ongoing  12/26/2021 1507 by Wellington Gallegos RN  Outcome: Ongoing     Problem: Loneliness or Risk for Loneliness  Goal: Demonstrate positive use of time alone when socialization is not possible  12/27/2021 0207 by Eugene Madden RN  Outcome: Ongoing  12/26/2021 1507 by Wellington Gallegos RN  Outcome: Ongoing     Problem: Patient Education: Go to Patient Education Activity  Goal: Patient/Family Education  12/27/2021 0207 by Eugene Madden RN  Outcome: Ongoing  12/26/2021 1507 by Wellington Gallegos RN  Outcome: Ongoing

## 2021-12-27 NOTE — PROGRESS NOTES
Comprehensive Nutrition Assessment    Type and Reason for Visit:  Initial,Positive Nutrition Screen    Nutrition Recommendations/Plan:   Continue regular diet  Begin Ensure Enlive BID  Document meal and supplement intakes in flowsheet    RD did not conduct direct, in-person nutrition evaluation in efforts to reduce exposure and use of PPE for high risk persons, PUI persons, patients who have tested positive for Covid-19 or those awaiting respiratory panel results. EMR was screened for nutrition risk factors, as defined per nutrition standards of care. Nutrition Assessment:  Positive nutrition screen. Currently in droplet plus isolation for COVID rule-out. Pt with dementia from nursing home. No significant wt loss per care everywhere. On regular diet with intakes 26-75%. Will trial Ensure Enlive and monitor for acceptance. Malnutrition Assessment:  Malnutrition Status: At risk for malnutrition (Comment)    Context:  Acute Illness       Estimated Daily Nutrient Needs:  Energy (kcal):  4040-6144 (25-30kcal/57kg); Weight Used for Energy Requirements:  Current     Protein (g):  68-80 (1.2-1.4g/57kg); Weight Used for Protein Requirements:  Current        Fluid (ml/day): 1 ml/kcal      Nutrition Related Findings:  Oriented to person. +BM 12/26. No edema. Wounds:  None       Current Nutrition Therapies:    ADULT DIET; Regular    Anthropometric Measures:  · Height: 5' 4\" (162.6 cm)  · Current Body Weight: 125 lb (56.7 kg)   · Admission Body Weight: 123 lb (55.8 kg)    · Ideal Body Weight: 120 lbs; % Ideal Body Weight 104.2 %   · BMI: 21.4  · BMI Categories: Normal Weight (BMI 18.5-24. 9)       Nutrition Diagnosis:   · Inadequate oral intake related to inadequate protein-energy intake as evidenced by intake 26-50%    Nutrition Interventions:   Food and/or Nutrient Delivery:  Continue Current Diet,Start Oral Nutrition Supplement  Nutrition Education/Counseling:  Education not indicated   Coordination of Nutrition Care:  Continue to monitor while inpatient    Goals:  PO intake greater than 50%       Nutrition Monitoring and Evaluation:   Behavioral-Environmental Outcomes:  None Identified   Food/Nutrient Intake Outcomes:  Food and Nutrient Intake,Supplement Intake  Physical Signs/Symptoms Outcomes:  Meal Time Behavior,Weight,Skin     Discharge Planning:     Too soon to determine     Electronically signed by Tiffany Carranza RD, LD on 12/27/21 at 12:56 PM EST    Contact: [phone]

## 2021-12-27 NOTE — PLAN OF CARE
Nutrition Problem #1: Inadequate oral intake  Intervention: Food and/or Nutrient Delivery: Continue Current Diet,Start Oral Nutrition Supplement  Nutritional Goals: PO intake greater than 50%

## 2021-12-27 NOTE — PROGRESS NOTES
Hospitalist Progress Note      PCP: Christal SPEARS    Date of Admission: 12/25/2021        Subjective: Some confusion noted, denies shortness of breath though. Medications:  Reviewed    Infusion Medications    sodium chloride       Scheduled Medications    metoprolol tartrate  25 mg Oral BID    sertraline  50 mg Oral Daily    lisinopril  10 mg Oral Daily    LORazepam  0.5 mg Oral Daily    sodium chloride flush  5-40 mL IntraVENous 2 times per day    enoxaparin  40 mg SubCUTAneous Daily     PRN Meds: LORazepam, sodium chloride flush, sodium chloride, ondansetron **OR** ondansetron, polyethylene glycol, acetaminophen **OR** acetaminophen      Intake/Output Summary (Last 24 hours) at 12/27/2021 1239  Last data filed at 12/27/2021 0533  Gross per 24 hour   Intake 1131.08 ml   Output    Net 1131.08 ml       Physical Exam Performed:    /64   Pulse 66   Temp 98.4 °F (36.9 °C) (Oral)   Resp 18   Wt 125 lb 10.6 oz (57 kg)   SpO2 98%     General appearance: No apparent distress  Neck: Supple  Respiratory:  Normal respiratory effort. Clear to auscultation, bilaterally without Rales/Wheezes/Rhonchi. Cardiovascular: Regular rate and rhythm with normal S1/S2 without murmurs, rubs or gallops. Abdomen: Soft, non-tender, non-distended   Musculoskeletal: No clubbing, cyanosis   Skin: Skin color, texture, turgor normal.  No rashes or lesions.   Neurologic:  No focal weakness   Psychiatric: Awake   Capillary Refill: Brisk,3 seconds, normal   Peripheral Pulses: +2 palpable, equal bilaterally       Labs:   Recent Labs     12/25/21  1417 12/26/21  0812   WBC 5.1 4.7   HGB 11.0* 9.7*   HCT 32.7* 29.4*    218     Recent Labs     12/25/21  1417 12/26/21  0812   * 135*   K 4.2 4.1   CL 94* 102   CO2 24 24   BUN 20 16   CREATININE 1.4* 0.9   CALCIUM 9.4 8.6     Recent Labs     12/25/21  1417   AST 17   ALT 10   BILITOT 0.3   ALKPHOS 78     No results for input(s): INR in the last 72 hours. Recent Labs     12/25/21  1417 12/26/21  0812   TROPONINI 0.04* 0.02*       Urinalysis:      Lab Results   Component Value Date    NITRU Negative 12/25/2021    BLOODU Negative 12/25/2021    SPECGRAV 1.010 12/25/2021    GLUCOSEU Negative 12/25/2021       Radiology:  CT ABDOMEN PELVIS WO CONTRAST Additional Contrast? None   Final Result   1. Increased fluid in the stomach ,small bowel and colon, correlate for acute   gastro enterocolitis. No abscess or free air noted. No bowel obstruction. 2. Multiple left renal simple cysts. RECOMMENDATIONS:   Unavailable                 Assessment/Plan:    Active Hospital Problems    Diagnosis     Syncope and collapse [R55]      1. Syncope and collapse, likely vasovagal reaction, telemetry monitor, monitor closely. Will get echo. 2.  Acute kidney injury, can contribute to presentation, on IV fluid currently. Creatinine improved from 1.4-0.9.  3.  Mild elevated troponin, trend for now  4. Nausea and vomiting, improved  5. COVID-19 infection, monitor closely supportive measures apparently she was for very short term reading hypoxic in the ED but thereafter she was not in need of any oxygen, now she is on oxygen, I will proceed with starting dexamethasone. 6.  Anemia, appears chronic, no signs of bleeding, follow-up as outpatient  7. Mild hyponatremia, likely hypovolemic, improved. Diet: ADULT DIET;  Regular  Code Status: DNR-CC        Saeid Nolan MD

## 2021-12-27 NOTE — PROGRESS NOTES
Occupational Therapy   Occupational Therapy Initial Assessment  Date: 2021   Patient Name: Prudence Camp  MRN: [id number]     : 1928    Date of Service: 2021    Discharge Recommendations:  3-5 sessions per week,Patient would benefit from continued therapy after discharge     Prudence Camp scored a 12/24 on the AM-PAC ADL Inpatient form. Current research shows that an AM-PAC score of 17 or less is typically not associated with a discharge to the patient's home setting. Based on the patient's AM-PAC score and their current ADL deficits, it is recommended that the patient have 3-5 sessions per week of Occupational Therapy at d/c to increase the patient's independence. Please see assessment section for further patient specific details. If patient discharges prior to next session this note will serve as a discharge summary. Please see below for the latest assessment towards goals. Assessment   Performance deficits / Impairments: Decreased functional mobility ; Decreased ADL status; Decreased safe awareness;Decreased cognition;Decreased balance  Assessment: Pt is a 81 yo F admitted with syncope and collapse, positive for covid-19. PTA, pt is from a nursing facility and unable to provide any PLOF. Per chart review, she was living in her own condo w/ family members assisting frequently as recent as 2021. This date, pt required max Ax2 for bed mobility and sat EOB x30 mins w/ CGA/SBA to eat breakfast and perform grooming w/ setup. Pt highly limited by cognition and only oriented to herself. WIll continue to see on acute in order to address the above deficits and maximize pt's functional performance. She demonstrates need for ongoing skilled therapy 3-5 times/week at d/c to increase her safety and independence.   Treatment Diagnosis: Decreased: ADLs, fxl transfers, fxl mobility, cognition  Prognosis: Fair  Decision Making: Medium Complexity  OT Education: OT Role;Plan of Care;Transfer Training;ADL Adaptive Strategies;Orientation  REQUIRES OT FOLLOW UP: Yes  Activity Tolerance  Activity Tolerance: Treatment limited secondary to decreased cognition  Safety Devices  Safety Devices in place: Yes  Type of devices: Call light within reach; Bed alarm in place;Nurse notified; Left in bed;Patient at risk for falls  Restraints  Initially in place: Yes  Restraints: bilat wrist restraints re-applied at end of session           Patient Diagnosis(es): There were no encounter diagnoses. has no past medical history on file. has no past surgical history on file. Treatment Diagnosis: Decreased: ADLs, fxl transfers, fxl mobility, cognition      Restrictions  Restrictions/Precautions  Restrictions/Precautions: Fall Risk,Isolation  Position Activity Restriction  Other position/activity restrictions: Droplet Plus : COVID +. Room Air. Subjective   General  Chart Reviewed: Yes  Patient assessed for rehabilitation services?: Yes  Additional Pertinent Hx: per H&P: \"80year-old female with past medical history of dementia presented from a nursing facility with syncopal episode. Apparently patient was having a bowel movement on the toilet when she had a syncopal episode. She was found to be unconscious for a few minutes. Patient is unable to provide any history given her dementia. On arrival to the hospital patient was noted to be mildly hypotensive. Lab work showed elevated creatinine hyponatremia. Patient's Covid test came back positive. She had a CT abdomen and pelvis performed which showed no acute findings. Given her syncopal episode patient was admitted to the hospital for the work-up and management. \"  Family / Caregiver Present: No  Referring Practitioner: Sofia Rodarte MD  Diagnosis: Syncopal episode, covid-19  Subjective  Subjective: Pt met b/s for OT eval/tx w/ PT. Pt in bed, confused but agreeable to therapy.  Pt denied pain  Pain Assessment  Pain Assessment: 0-10  Pain Level: 0  Vital Signs  Temp: 98.4 °F (36.9 °C)  Temp Source: Oral  Pulse: 69  Heart Rate Source: Monitor  Resp: 18  BP: (!) 123/57  BP Location: Left upper arm  MAP (mmHg): 79  Patient Position: Sitting  Social/Functional History  Social/Functional History  Type of Home: Facility  Additional Comments: Per 9/28/21 PCP visit, Currently in her own condo and family members take turns staying with her during the daytime. She is home alone at night       Objective   Vision: Within Functional Limits  Hearing: Exceptions to [facility]  Hearing Exceptions: Hard of hearing/hearing concerns    Orientation  Overall Orientation Status: Impaired  Orientation Level: Oriented to person;Disoriented to place; Disoriented to time;Disoriented to situation     Balance  Sitting Balance: Contact guard assistance (min A progressing to CGA/SBA EOB x20 mins)  Standing Balance: Unable to assess(comment)  ADL  Feeding: Setup; Increased time to complete (pt ate breakfast sitting EOB)  Grooming: Setup (Pt washed face w/ prepared cloth)  Additional Comments: Anticipate mod A for UB bathing/dressing, max/total A LB bathing/dressing, max/total A toileting based on ROM, strength, endurance this session  Tone RUE  RUE Tone: Normotonic  Tone LUE  LUE Tone: Normotonic  Coordination  Movements Are Fluid And Coordinated: Yes     Bed mobility  Supine to Sit: Maximum assistance;2 Person assistance  Sit to Supine: Maximum assistance;2 Person assistance  Comment: Pt sat to EOB ~ 30 minutes. Initially rigid with heavy posterior lean but was able to transition to good statici and dyanmic sitting balance. Pt ate majority of breakfast.  Transfers  Sit to stand: Unable to assess  Stand to sit: Unable to assess  Transfer Comments: RN requested pt remain in bed d/t cognition     Cognition  Overall Cognitive Status: Exceptions  Arousal/Alertness: Appropriate responses to stimuli  Following Commands:  Follows one step commands with repetition  Attention Span: Attends with cues to redirect  Memory: Decreased short term memory;Decreased long term memory  Safety Judgement: Decreased awareness of need for safety;Decreased awareness of need for assistance  Problem Solving: Decreased awareness of errors  Insights: Not aware of deficits  Initiation: Requires cues for all  Sequencing: Requires cues for all                 LUE AROM (degrees)  LUE AROM : WFL  Left Hand AROM (degrees)  Left Hand AROM: WFL  RUE AROM (degrees)  RUE AROM : WFL  Right Hand AROM (degrees)  Right Hand AROM: WFL  LUE Strength  Gross LUE Strength: WFL  RUE Strength  Gross RUE Strength: WFL        Plan   Plan  Times per week: 2-3  Times per day: Daily  Current Treatment Recommendations: Strengthening,Endurance Training,ROM,Balance Training,Functional Mobility Training,Safety Education & Training,Equipment Evaluation, Education, & procurement,Patient/Caregiver Education & Training,Self-Care / ADL           AM-PAC Score  AM-[facility] Inpatient Daily Activity Raw Score: 12 (12/27/21 0934)  AM-PAC Inpatient ADL T-Scale Score : 30.6 (12/27/21 0934)  ADL Inpatient CMS 0-100% Score: 66.57 (12/27/21 0934)  ADL Inpatient CMS G-Code Modifier : CL (12/27/21 0934)    Goals  Short term goals  Time Frame for Short term goals: Prior to d/c  Short term goal 1: Pt will complete bed mobility w/ min Ax2  Short term goal 2: Pt will complete fxl transfers w/ min Ax2 using LRAD  Short term goal 3: Pt will toilet w/ mod A  Short term goal 4: Pt will bathe w/ mod A  Short term goal 5: Pt will feed and groom mod I  Long term goals  Time Frame for Long term goals : LTG=STG  Patient Goals   Patient goals : pt unable to state d/t cognition       Therapy Time   Individual Concurrent Group Co-treatment   Time In 0820         Time Out 0920         Minutes [address] [address]

## 2021-12-27 NOTE — ED PROVIDER NOTES
[facility]      Pt Name: Lobo Mayer  MRN: [id number]  [place] 9/26/1928  Date of evaluation: 12/25/2021  Provider: Nahum Guzman MD    [address] [place] [address]       Chief Complaint   Patient presents with    Loss of Consciousness         HISTORY OF PRESENT ILLNESS   (Location/Symptom, Timing/Onset, Context/Setting, Quality, Duration, Modifying Factors, Severity)  Note limiting factors. Lobo Mayer is a 80 y.o. female who presents to the emergency department wiith a syncopal episore     HPI    80 yof who presents with an episode of syncope. No preceding CP or palpitations, currently has no significant complaints. Had been attempting to go to  at [facility]. Nursing Notes were reviewed. REVIEW OF SYSTEMS    (2-9 systems for level 4, 10 or more for level 5)     Review of Systems   Constitutional: Negative for chills and fever. Respiratory: Negative for shortness of breath. Cardiovascular: Negative for chest pain and palpitations. Gastrointestinal: Negative for abdominal pain. Neurological: Positive for syncope. Negative for seizures, speech difficulty, weakness and headaches. Except as noted above the remainder of the review of systems was reviewed and negative. PAST MEDICAL HISTORY   No past medical history on file. SURGICAL HISTORY     No past surgical history on file. CURRENT MEDICATIONS       Discharge Medication List as of 12/29/2021  3:26 PM      CONTINUE these medications which have NOT CHANGED    Details   metoprolol tartrate (LOPRESSOR) 25 MG tablet Take 25 mg by mouth 2 times dailyHistorical Med      sertraline (ZOLOFT) 50 MG tablet Take 50 mg by mouth dailyHistorical Med      !! LORazepam (ATIVAN) 0.5 MG tablet Take 0.5 mg by mouth every 6 hours as needed for Anxiety. Historical Med      !! LORazepam (ATIVAN) 0.5 MG tablet Take 0.5 mg by mouth daily.  At 1 pmHistorical Med      lisinopril (PRINIVIL;ZESTRIL) 5 MG tablet Take 5 mg by mouth dailyHistorical Med       !! - Potential duplicate medications found. Please discuss with provider. ALLERGIES     Patient has no known allergies. FAMILY HISTORY     No family history on file. SOCIAL HISTORY       Social History     Socioeconomic History    Marital status:      Spouse name: Not on file    Number of children: Not on file    Years of education: Not on file    Highest education level: Not on file   Occupational History    Not on file   Tobacco Use    Smoking status: Not on file    Smokeless tobacco: Not on file   Substance and Sexual Activity    Alcohol use: Not on file    Drug use: Not on file    Sexual activity: Not on file   Other Topics Concern    Not on file   Social History Narrative    Not on file     Social Determinants of Health     Financial Resource Strain:     Difficulty of Paying Living Expenses: Not on file   Food Insecurity:     Worried About Running Out of Food in the Last Year: Not on file    Keyla of Food in the Last Year: Not on file   Transportation Needs:     Lack of Transportation (Medical): Not on file    Lack of Transportation (Non-Medical):  Not on file   Physical Activity:     Days of Exercise per Week: Not on file    Minutes of Exercise per Session: Not on file   Stress:     Feeling of Stress : Not on file   Social Connections:     Frequency of Communication with Friends and Family: Not on file    Frequency of Social Gatherings with Friends and Family: Not on file    Attends Hinduism Services: Not on file    Active Member of Clubs or Organizations: Not on file    Attends Club or Organization Meetings: Not on file    Marital Status: Not on file   Intimate Partner Violence:     Fear of Current or Ex-Partner: Not on file    Emotionally Abused: Not on file    Physically Abused: Not on file    Sexually Abused: Not on file   Housing Stability:     Unable to Pay for Housing in the Last Year: Not on file    Number of Places Lived in the Last Year: Not on file    Unstable Housing in the Last Year: Not on file       SCREENINGS         Christian Coma Scale  Eye Opening: Spontaneous  Best Verbal Response: Confused  Best Motor Response: Obeys commands  McLean Coma Scale Score: 14                     CIWA Assessment  BP: 130/62  Pulse: 60                 PHYSICAL EXAM    (up to 7 for level 4, 8 or more for level 5)     ED Triage Vitals   BP Temp Temp Source Pulse Resp SpO2 Height Weight   12/25/21 1415 12/25/21 1426 12/25/21 1426 12/25/21 1415 12/25/21 1415 12/25/21 1415 12/27/21 1245 12/25/21 1430   81/68 98 °F (36.7 °C) Oral 64 17 93 % 5' 4\" (1.626 m) 131 lb 6.3 oz (59.6 kg)       Physical Exam  Vitals reviewed. Constitutional:       Appearance: Normal appearance. HENT:      Head: Normocephalic and atraumatic. Nose: Nose normal.      Mouth/Throat:      Mouth: Mucous membranes are moist.   Eyes:      Extraocular Movements: Extraocular movements intact. Pupils: Pupils are equal, round, and reactive to light. Cardiovascular:      Rate and Rhythm: Regular rhythm. Bradycardia present. Heart sounds: No murmur heard. No friction rub. No gallop. Pulmonary:      Effort: Pulmonary effort is normal.      Breath sounds: Normal breath sounds. Abdominal:      General: Abdomen is flat. Bowel sounds are normal.      Palpations: Abdomen is soft. Tenderness: There is no abdominal tenderness. Hernia: No hernia is present. Musculoskeletal:         General: Normal range of motion. Cervical back: Normal range of motion and neck supple. Skin:     Capillary Refill: Capillary refill takes less than 2 seconds. Neurological:      Mental Status: She is alert. Mental status is at baseline.          DIAGNOSTIC RESULTS     EKG: All EKG's are interpreted by the Emergency Department Physician who either signs or Co-signs this chart in the absence of a cardiologist.        RADIOLOGY:   Non-plain film images such as CT, Ultrasound and MRI are read by the radiologist. Plain radiographic images are visualized and preliminarily interpreted by the emergency physician with the below findings:        Interpretation per the Radiologist below, if available at the time of this note:    CT ABDOMEN PELVIS WO CONTRAST Additional Contrast? None   Final Result   1. Increased fluid in the stomach ,small bowel and colon, correlate for acute   gastro enterocolitis. No abscess or free air noted. No bowel obstruction. 2. Multiple left renal simple cysts.       RECOMMENDATIONS:   Unavailable               ED BEDSIDE ULTRASOUND:   Performed by ED Physician - none    LABS:  Labs Reviewed   COVID-19, RAPID - Abnormal; Notable for the following components:       Result Value    SARS-CoV-2, NAAT DETECTED (*)     All other components within normal limits    Narrative:     Performed at:  [place] [address], [place] [facility]   Phone [phone]   CBC WITH AUTO DIFFERENTIAL - Abnormal; Notable for the following components:    RBC 3.39 (*)     Hemoglobin 11.0 (*)     Hematocrit 32.7 (*)     All other components within normal limits    Narrative:     Performed at:  [facility]  [address], [place] [employer] [address]   Phone [phone]   COMPREHENSIVE METABOLIC PANEL W/ REFLEX TO MG FOR LOW K - Abnormal; Notable for the following components:    Sodium 131 (*)     Chloride 94 (*)     Glucose 113 (*)     CREATININE 1.4 (*)     GFR Non- 35 (*)     GFR  42 (*)     All other components within normal limits    Narrative:     Performed at:  [facility]  [address], [place] [employer] [address]   Phone [phone]   TROPONIN - Abnormal; Notable for the following components:    Troponin 0.04 (*)     All other components within normal limits    Narrative:     Performed at:  [facility]  [address], [place] [employer]   Phone [phone]   BASIC METABOLIC PANEL W/ REFLEX TO MG FOR LOW K - Abnormal; Notable for the following components:    Sodium 135 (*)     GFR Non- 58 (*)     All other components within normal limits    Narrative:     Performed at:  [facility]  [address], [place] [employer] [address]   Phone [phone]   CBC WITH AUTO DIFFERENTIAL - Abnormal; Notable for the following components:    RBC 3.04 (*)     Hemoglobin 9.7 (*)     Hematocrit 29.4 (*)     All other components within normal limits    Narrative:     Performed at:  [facility]  [address], [place] [facility] [employer]   Phone [phone]   TROPONIN - Abnormal; Notable for the following components:    Troponin 0.02 (*)     All other components within normal limits    Narrative:     Performed at:  [facility]  [address], [place] [employer]   Phone [phone] - Abnormal; Notable for the following components:    SARS-CoV-2 Detected (*)     All other components within normal limits    Narrative:     ORDER WAS CANCELLED 12/26/2021 13:02, Cancelled: In-house testing.   Performed at:  [facility]  [address], [place] [facility] [place] [address]   Phone [phone]   LIPASE    Narrative:     Performed at:  [facility]  [address], [place] [employer] [address]   Phone [phone]   URINE RT REFLEX TO CULTURE    Narrative:     Performed at:  [facility]  [facility] [address] [place] [employer] [address]   Phone [phone]   LACTATE, SEPSIS    Narrative:     Performed at:  [facility]  [address], [place] [employer]   Phone [phone]       All other labs were within normal range or not returned as of this dictation. EMERGENCY DEPARTMENT COURSE and DIFFERENTIAL DIAGNOSIS/MDM:   Vitals:    Vitals:    12/29/21 0814 12/29/21 0858 12/29/21 1115 12/29/21 1329   BP: (!) 149/66 (!) 152/64  130/62   Pulse: 68 68 63 60   Resp: 16 16 16 16   Temp: 97.9 °F (36.6 °C) 97.9 °F (36.6 °C)  98 °F (36.7 °C)   TempSrc: Oral Oral  Oral   SpO2: 97% 96%  95%   Weight:       Height:           The above H&P were performed, I reviewed her PMH, PSH, SH and FH. Labs returned consistent with hypovolemia and FOREST. Started on IVF. MDM    Considered the dx of cardiogenic syncope however, EKG is nonconcerning. No complaints of CP or palpitations. Likely vasovagal with an element of hypovolemia. REASSESSMENT          CRITICAL CARE TIME   1      CONSULTS:  IP CONSULT TO HOSPITALIST    PROCEDURES:  Unless otherwise noted below, none     Procedures        FINAL IMPRESSION      1. Syncope and collapse          DISPOSITION/PLAN   DISPOSITION Admitted 12/25/2021 04:22:49 PM      PATIENT REFERRED TO:  Dirk Garcia MD  [address] [place] [phone]    Schedule an appointment as soon as possible for a visit in 2 weeks  post-hospital follow up for severe mitral regurg on echo 12/27      DISCHARGE MEDICATIONS:  Discharge Medication List as of 12/29/2021  3:26 PM      START taking these medications    Details   dexamethasone (DECADRON) 6 MG tablet Take 1 tablet by mouth daily for 7 days, Disp-7 tablet, R-0Print           Controlled Substances Monitoring:     No flowsheet data found.     (Please note that portions of this note were completed with a voice recognition program.  Efforts were made to edit the dictations but occasionally words are mis-transcribed.)    Emma Hatfield MD (electronically signed)  Attending Emergency Physician         Emma Hatfield MD  01/03/22 0740

## 2021-12-28 PROCEDURE — 6370000000 HC RX 637 (ALT 250 FOR IP): Performed by: INTERNAL MEDICINE

## 2021-12-28 PROCEDURE — 97535 SELF CARE MNGMENT TRAINING: CPT

## 2021-12-28 PROCEDURE — 97530 THERAPEUTIC ACTIVITIES: CPT

## 2021-12-28 PROCEDURE — 6360000002 HC RX W HCPCS: Performed by: INTERNAL MEDICINE

## 2021-12-28 PROCEDURE — 2700000000 HC OXYGEN THERAPY PER DAY

## 2021-12-28 PROCEDURE — 1200000000 HC SEMI PRIVATE

## 2021-12-28 PROCEDURE — 2580000003 HC RX 258: Performed by: INTERNAL MEDICINE

## 2021-12-28 PROCEDURE — 94761 N-INVAS EAR/PLS OXIMETRY MLT: CPT

## 2021-12-28 RX ADMIN — DEXAMETHASONE 6 MG: 6 TABLET ORAL at 10:06

## 2021-12-28 RX ADMIN — LORAZEPAM 0.5 MG: 0.5 TABLET ORAL at 15:15

## 2021-12-28 RX ADMIN — LISINOPRIL 10 MG: 10 TABLET ORAL at 10:06

## 2021-12-28 RX ADMIN — ENOXAPARIN SODIUM 40 MG: 100 INJECTION SUBCUTANEOUS at 10:06

## 2021-12-28 RX ADMIN — SERTRALINE 50 MG: 50 TABLET, FILM COATED ORAL at 10:06

## 2021-12-28 RX ADMIN — METOPROLOL TARTRATE 25 MG: 25 TABLET, FILM COATED ORAL at 10:53

## 2021-12-28 RX ADMIN — SODIUM CHLORIDE, PRESERVATIVE FREE 10 ML: 5 INJECTION INTRAVENOUS at 10:07

## 2021-12-28 ASSESSMENT — PAIN SCALES - PAIN ASSESSMENT IN ADVANCED DEMENTIA (PAINAD)
FACIALEXPRESSION: 0
CONSOLABILITY: 0
NEGVOCALIZATION: 0
BODYLANGUAGE: 0
BREATHING: 0
TOTALSCORE: 0
FACIALEXPRESSION: 0
BODYLANGUAGE: 0
BREATHING: 0
NEGVOCALIZATION: 0
CONSOLABILITY: 0
TOTALSCORE: 0

## 2021-12-28 ASSESSMENT — PAIN SCALES - GENERAL: PAINLEVEL_OUTOF10: 0

## 2021-12-28 NOTE — PROGRESS NOTES
Hospitalist Progress Note      PCP: Rasta SPEARS    Date of Admission: 12/25/2021      Subjective: feels ok, confused, denies chest pain or SOB. Medications:  Reviewed    Infusion Medications    sodium chloride       Scheduled Medications    dexamethasone  6 mg Oral Daily    metoprolol tartrate  25 mg Oral BID    sertraline  50 mg Oral Daily    lisinopril  10 mg Oral Daily    LORazepam  0.5 mg Oral Daily    sodium chloride flush  5-40 mL IntraVENous 2 times per day    enoxaparin  40 mg SubCUTAneous Daily     PRN Meds: LORazepam, sodium chloride flush, sodium chloride, ondansetron **OR** ondansetron, polyethylene glycol, acetaminophen **OR** acetaminophen    No intake or output data in the 24 hours ending 12/28/21 1233    Physical Exam Performed:    BP (!) 162/68   Pulse 60   Temp 97.9 °F (36.6 °C) (Oral)   Resp 18   Ht 5' 4\" (1.626 m)   Wt 141 lb 5 oz (64.1 kg)   SpO2 98%   BMI 24.26 kg/m²     General appearance: No apparent distress  Neck: Supple  Respiratory:  Normal respiratory effort. Clear to auscultation, bilaterally without Rales/Wheezes/Rhonchi. Cardiovascular: Regular rate and rhythm with normal S1/S2 without murmurs, rubs or gallops. Abdomen: Soft, non-tender  Musculoskeletal: No clubbing, cyanosis  Skin: Skin color, texture, turgor normal.  No rashes or lesions. Neurologic:  Moving all extremities. Psychiatric: awake and confused. Capillary Refill: Brisk,3 seconds, normal   Peripheral Pulses: +2 palpable, equal bilaterally       Labs:   Recent Labs     12/25/21  1417 12/26/21  0812   WBC 5.1 4.7   HGB 11.0* 9.7*   HCT 32.7* 29.4*    218     Recent Labs     12/25/21  1417 12/26/21  0812   * 135*   K 4.2 4.1   CL 94* 102   CO2 24 24   BUN 20 16   CREATININE 1.4* 0.9   CALCIUM 9.4 8.6     Recent Labs     12/25/21  1417   AST 17   ALT 10   BILITOT 0.3   ALKPHOS 78     No results for input(s): INR in the last 72 hours.   Recent Labs     12/25/21  1417 12/26/21  0812   TROPONINI 0.04* 0.02*       Urinalysis:      Lab Results   Component Value Date    NITRU Negative 12/25/2021    BLOODU Negative 12/25/2021    SPECGRAV 1.010 12/25/2021    GLUCOSEU Negative 12/25/2021       Radiology:  CT ABDOMEN PELVIS WO CONTRAST Additional Contrast? None   Final Result   1. Increased fluid in the stomach ,small bowel and colon, correlate for acute   gastro enterocolitis. No abscess or free air noted. No bowel obstruction. 2. Multiple left renal simple cysts. RECOMMENDATIONS:   Unavailable                 Assessment/Plan:    Active Hospital Problems    Diagnosis     Syncope and collapse [R55]        1.  Syncope and collapse, likely vasovagal reaction, telemetry monitor, monitor closely.  echo noted. 2.  Acute kidney injury, can contribute to presentation, on IV fluid currently.  Creatinine improved. 3.  Mild elevated troponin, trend for now  4.  Nausea and vomiting, improved  5.  COVID-19 infection, monitor closely supportive measures apparently she was for very short term reading hypoxic in the ED but thereafter she was not in need of any oxygen, then she s in need of oxygen, started on dexamethazone. 6.  Anemia, appears chronic, no signs of bleeding, follow-up as outpatient  7.  Mild hyponatremia, likely hypovolemic, improved. 8. Severe MR, follow up with cardiology as outpatient. Diet: ADULT DIET;  Regular  ADULT ORAL NUTRITION SUPPLEMENT; Breakfast, Dinner; Standard High Calorie/High Protein Oral Supplement  Code Status: DNR-CC      Houssam Cherry Hammans, MD

## 2021-12-28 NOTE — ACP (ADVANCE CARE PLANNING)
Advance Care Planning     Advance Care Planning Activator (Inpatient)  Conversation Note      Date of ACP Conversation: 12/28/2021     Conversation Conducted with: Patient with [address] Decision Maker: Next of Kin by law (only applies in absence of above) (name) daughter Rashard Munguia. ACP Activator: MARIA GUADALUPE Bernard    Health Care Decision Maker:     Current Designated Health Care Decision Maker:     Primary Decision Maker: Earnest Nieves - Child - [phone]    Secondary Decision Maker: Ambreen Elias - Child - [phone]    Care Preferences    Ventilation: \"If you were in your present state of health and suddenly became very ill and were unable to breathe on your own, what would your preference be about the use of a ventilator (breathing machine) if it were available to you? \"      Would the patient desire the use of ventilator (breathing machine)?: no    \"If your health worsens and it becomes clear that your chance of recovery is unlikely, what would your preference be about the use of a ventilator (breathing machine) if it were available to you? \"     Would the patient desire the use of ventilator (breathing machine)?: No      Resuscitation  \"CPR works best to restart the heart when there is a sudden event, like a heart attack, in someone who is otherwise healthy. Unfortunately, CPR does not typically restart the heart for people who have serious health conditions or who are very sick. \"    \"In the event your heart stopped as a result of an underlying serious health condition, would you want attempts to be made to restart your heart (answer \"yes\" for attempt to resuscitate) or would you prefer a natural death (answer \"no\" for do not attempt to resuscitate)? \" no       [] Yes   [] No   Educated Patient / [employer] regarding differences between Advance Directives and portable DNR orders.     Length of ACP Conversation in minutes: 3     Conversation Outcomes:  [x] ACP discussion completed  [] Existing advance directive reviewed with patient; no changes to patient's previously recorded wishes  [] New Advance Directive completed  [] Portable Do Not Rescitate prepared for Provider review and signature  [] POLST/POST/MOLST/MOST prepared for Provider review and signature      Follow-up plan:    [] Schedule follow-up conversation to continue planning  [] Referred individual to Provider for additional questions/concerns   [] Advised patient/agent/surrogate to review completed ACP document and update if needed with changes in condition, patient preferences or care setting    [x] This note routed to one or more involved healthcare providers [address] [place] [address] primary care physician. Respectfully submitted,    MARIA GUADALUPE Webber  [facility]   [phone]    Electronically signed by MARIA GUADALUPE Tobias on 12/28/2021 at 9:19 AM

## 2021-12-28 NOTE — CARE COORDINATION
FELICE reached out to daughter Kaity Montoya today via telephone at [phone]. She stated that this patient was from [facility] and she is in agreement with her return when she is ready for discharge. She had several medical questions about tests that were ran yesterday. SW will defer her back to the MD for follow up. FELICE informed that therapies are recommending skilled placement prior to return to [facility]. She stated that she believes that [facility] can manage. FELICE informed that she is a two person assist today so for safety we need to make sure. SW placed phone call to [facility] and requested to speak to admissions today. Their phone number is [phone]. SW spoke to Justin Zapata in admissions. She stated that they can accommodate the patient back for skilled even though they recommend a two person assist. She stated that having the patient in restraints is not an issue either because they specialize in working with dementia patients. She asks that we call the number listed above when the patient is ready for discharge. Respectfully submitted,    Mihaela Moreno GustavoMARIA GUADALUPE Rosado  [facility]   [phone]    Electronically signed by MARIA GUADALUPE Bazan on 12/28/2021 at 8:58 AM

## 2021-12-28 NOTE — PLAN OF CARE
Problem: Airway Clearance - Ineffective  Goal: Achieve or maintain patent airway  Outcome: Ongoing     Problem: Gas Exchange - Impaired  Goal: Absence of hypoxia  Outcome: Ongoing  Goal: Promote optimal lung function  Outcome: Ongoing     Problem: Breathing Pattern - Ineffective  Goal: Ability to achieve and maintain a regular respiratory rate  Outcome: Ongoing     Problem:  Body Temperature -  Risk of, Imbalanced  Goal: Ability to maintain a body temperature within defined limits  Outcome: Ongoing  Goal: Will regain or maintain usual level of consciousness  Outcome: Ongoing  Goal: Complications related to the disease process, condition or treatment will be avoided or minimized  Outcome: Ongoing     Problem: Isolation Precautions - Risk of Spread of Infection  Goal: Prevent transmission of infection  Outcome: Ongoing     Problem: Nutrition Deficits  Goal: Optimize nutritional status  Outcome: Ongoing     Problem: Risk for Fluid Volume Deficit  Goal: Maintain normal heart rhythm  Outcome: Ongoing  Goal: Maintain absence of muscle cramping  Outcome: Ongoing  Goal: Maintain normal serum potassium, sodium, calcium, phosphorus, and pH  Outcome: Ongoing     Problem: Loneliness or Risk for Loneliness  Goal: Demonstrate positive use of time alone when socialization is not possible  Outcome: Ongoing     Problem: Fatigue  Goal: Verbalize increase energy and improved vitality  Outcome: Ongoing     Problem: Patient Education: Go to Patient Education Activity  Goal: Patient/Family Education  Outcome: Ongoing     Problem: Confusion - Acute:  Goal: Absence of continued neurological deterioration signs and symptoms  Description: Absence of continued neurological deterioration signs and symptoms  12/27/2021 2159 by Melany Orosco RN  Outcome: Ongoing  12/27/2021 1145 by Sekou De Leon RN  Outcome: Ongoing  Goal: Mental status will be restored to baseline  Description: Mental status will be restored to baseline  Outcome: Ongoing Problem: Discharge Planning:  Goal: Ability to perform activities of daily living will improve  Description: Ability to perform activities of daily living will improve  Outcome: Ongoing  Goal: Participates in care planning  Description: Participates in care planning  Outcome: Ongoing     Problem: Injury - Risk of, Physical Injury:  Goal: Absence of physical injury  Description: Absence of physical injury  Outcome: Ongoing  Goal: Will remain free from falls  Description: Will remain free from falls  Outcome: Ongoing     Problem: Mood - Altered:  Goal: Mood stable  Description: Mood stable  Outcome: Ongoing  Goal: Absence of abusive behavior  Description: Absence of abusive behavior  Outcome: Ongoing  Goal: Verbalizations of feeling emotionally comfortable while being cared for will increase  Description: Verbalizations of feeling emotionally comfortable while being cared for will increase  Outcome: Ongoing     Problem: Psychomotor Activity - Altered:  Goal: Absence of psychomotor disturbance signs and symptoms  Description: Absence of psychomotor disturbance signs and symptoms  Outcome: Ongoing     Problem: Sensory Perception - Impaired:  Goal: Demonstrations of improved sensory functioning will increase  Description: Demonstrations of improved sensory functioning will increase  Outcome: Ongoing  Goal: Decrease in sensory misperception frequency  Description: Decrease in sensory misperception frequency  Outcome: Ongoing  Goal: Able to refrain from responding to false sensory perceptions  Description: Able to refrain from responding to false sensory perceptions  Outcome: Ongoing  Goal: Demonstrates accurate environmental perceptions  Description: Demonstrates accurate environmental perceptions  Outcome: Ongoing  Goal: Able to distinguish between reality-based and nonreality-based thinking  Description: Able to distinguish between reality-based and nonreality-based thinking  Outcome: Ongoing  Goal: Able to interrupt nonreality-based thinking  Description: Able to interrupt nonreality-based thinking  Outcome: Ongoing     Problem: Sleep Pattern Disturbance:  Goal: Appears well-rested  Description: Appears well-rested  Outcome: Ongoing     Problem: Skin Integrity:  Goal: Will show no infection signs and symptoms  Description: Will show no infection signs and symptoms  Outcome: Ongoing  Goal: Absence of new skin breakdown  Description: Absence of new skin breakdown  Outcome: Ongoing     Problem: Falls - Risk of:  Goal: Absence of physical injury  Description: Absence of physical injury  Outcome: Ongoing  Goal: Will remain free from falls  Description: Will remain free from falls  Outcome: Ongoing     Problem: Non-Violent Restraints  Goal: Removal from restraints as soon as assessed to be safe  Outcome: Ongoing  Goal: No harm/injury to patient while restraints in use  Outcome: Ongoing  Goal: Patient's dignity will be maintained  Outcome: Ongoing     Problem: Nutrition  Goal: Optimal nutrition therapy  12/27/2021 2159 by Fabián Haile RN  Outcome: Ongoing  12/27/2021 1257 by Kassie Gutierrez RD, LD  Outcome: Ongoing

## 2021-12-28 NOTE — PROGRESS NOTES
Occupational Therapy  Facility/Department: [address] MED SURG  Daily Treatment Note  NAME: Lobo Mayer  : 1928  MRN: [id number]    Date of Service: 2021    Discharge Recommendations:  3-5 sessions per week,Patient would benefit from continued therapy after discharge     Lobo Mayer scored a  on the AM-PAC ADL Inpatient form. Current research shows that an AM-PAC score of 17 or less is typically not associated with a discharge to the patient's home setting. Based on the patient's AM-PAC score and their current ADL deficits, it is recommended that the patient have 3-5 sessions per week of Occupational Therapy at d/c to increase the patient's independence. Please see assessment section for further patient specific details. If patient discharges prior to next session this note will serve as a discharge summary. Please see below for the latest assessment towards goals. Assessment   Performance deficits / Impairments: Decreased functional mobility ; Decreased ADL status; Decreased safe awareness;Decreased cognition;Decreased balance  Assessment: Pt is a 79 yo F admitted with syncope and collapse, positive for covid-19. PTA, pt is from a nursing facility and unable to provide any PLOF. Per  note, pt is a resident of [address] [place] [address] but full PLOF is unknown. This date, pt required up to max Ax2 for bed mobility but was able to complete sit<>stand to sarah stedy lift w/ CGA. She sat EOB x15 mins w/ CGA/SBA and ate breakfast w/ setup. Pt continues to be limited by cognition and decreased activity tolerance. She demonstrates need for ongoing skilled therapy 3-5 times/week at d/c to increase her safety and independence.   Treatment Diagnosis: Decreased: ADLs, fxl transfers, fxl mobility, cognition  Prognosis: Fair  OT Education: OT Role;Plan of Care;Transfer Training;ADL Adaptive Strategies;Orientation  REQUIRES OT FOLLOW UP: Yes  Activity Tolerance  Activity Tolerance: Treatment limited secondary to decreased cognition  Activity Tolerance: Pt on 1L O2, difficult to obtain accurate O2 reading (telemetry box read mid/low 80s but dynamap read 99%) - RN aware  Safety Devices  Safety Devices in place: Yes  Type of devices: Call light within reach;Nurse notified; Patient at risk for falls; Bed alarm in place; Left in bed;Gait belt  Restraints  Initially in place: Yes  Restraints: bilat wrist restraints re-applied at end of session         Patient Diagnosis(es): There were no encounter diagnoses. has no past medical history on file. has no past surgical history on file. Restrictions  Restrictions/Precautions  Restrictions/Precautions: Fall Risk,Isolation  Position Activity Restriction  Other position/activity restrictions: Droplet Plus : COVID +. 1L O2  Subjective   General  Chart Reviewed: Yes  Patient assessed for rehabilitation services?: Yes  Additional Pertinent Hx: per H&P: \"80year-old female with past medical history of dementia presented from a nursing facility with syncopal episode. Apparently patient was having a bowel movement on the toilet when she had a syncopal episode. She was found to be unconscious for a few minutes. Patient is unable to provide any history given her dementia. On arrival to the hospital patient was noted to be mildly hypotensive. Lab work showed elevated creatinine hyponatremia. Patient's Covid test came back positive. She had a CT abdomen and pelvis performed which showed no acute findings. Given her syncopal episode patient was admitted to the hospital for the work-up and management. \"  Family / Caregiver Present: No  Referring Practitioner: Sofia Rodarte MD  Diagnosis: Syncopal episode, covid-19  Subjective  Subjective: Pt met b/s for OT cotx w/ PT. Pt in bed, confused but agreeable. Denied pain      Orientation  Orientation  Overall Orientation Status: Impaired  Orientation Level: Oriented to person;Disoriented to place; Disoriented to time;Disoriented to situation  Objective    ADL  Feeding: Setup (Pt ate breakfast sitting EOB)        Balance  Sitting Balance:  (CGA progressing to SBA EOB x15 mins)  Standing Balance: Contact guard assistance (1 min in [facility])  Functional Mobility  Functional Mobility Comments: not assessed, RN requested pt remain in bed d/t need for restraints  Bed mobility  Supine to Sit: Moderate assistance;Maximum assistance  Sit to Supine: Maximum assistance;2 Person assistance (pt attempted to lay straight back horizontal in bed, max Ax2 to lay down safely)  Comment: Pt sat EOB x15 mins, initially required assist to correct posterior lean and to flex at knees  Transfers  Sit to stand: Contact guard assistance  Stand to sit: Contact guard assistance  Transfer Comments: to/from [facility]                       Cognition  Overall Cognitive Status: Exceptions  Arousal/Alertness: Appropriate responses to stimuli  Following Commands:  Follows one step commands with repetition  Attention Span: Attends with cues to redirect  Memory: Decreased short term memory;Decreased long term memory;Decreased recall of recent events  Safety Judgement: Decreased awareness of need for safety;Decreased awareness of need for assistance  Problem Solving: Decreased awareness of errors  Insights: Not aware of deficits  Initiation: Requires cues for all  Sequencing: Requires cues for some               Plan   Plan  Times per week: 2-3  Times per day: Daily  Current Treatment Recommendations: Strengthening,Endurance Training,ROM,Balance Training,Functional Mobility Training,Safety Education & Training,Equipment Evaluation, Education, & procurement,Patient/Caregiver Education & Training,Self-Care / ADL    AM-[facility] Score  [facility] Inpatient Daily Activity Raw Score: 12 (12/28/21 1121)  AM-PAC Inpatient ADL T-Scale Score : 30.6 (12/28/21 1121)  ADL Inpatient CMS 0-100% Score: 66.57 (12/28/21 1121)  ADL Inpatient CMS G-Code Modifier : CL (12/28/21 1121)    Goals  Short term goals  Time Frame for Short term goals: Prior to d/c  Short term goal 1: Pt will complete bed mobility w/ min Ax2  Short term goal 2: Pt will complete fxl transfers w/ min Ax2 using LRAD  Short term goal 3: Pt will toilet w/ mod A  Short term goal 4: Pt will bathe w/ mod A  Short term goal 5: Pt will feed and groom mod I  Long term goals  Time Frame for Long term goals : LTG=STG  Patient Goals   Patient goals : pt unable to state d/t cognition       Therapy Time   Individual Concurrent Group Co-treatment   Time In 1035         Time Out 1115         Minutes [address] [place] [id number]

## 2021-12-28 NOTE — PROGRESS NOTES
Tolerance: Patient limited by cognitive status     Patient Diagnosis(es): There were no encounter diagnoses. has no past medical history on file. has no past surgical history on file. Restrictions  Restrictions/Precautions  Restrictions/Precautions: Fall Risk,Isolation  Position Activity Restriction  Other position/activity restrictions: Droplet Plus : COVID +. 1L O2, contact isolation     Subjective   General  Chart Reviewed: Yes  Additional Pertinent Hx: 79 y/o female admit from nursing facility with Syncope/Collapse, LETY; COVID +. PMH including Dementia. Response To Previous Treatment: Patient unable to report, no changes reported from family or staff  Family / Caregiver Present: No  Referring Practitioner: Dr. Becca Brothers: Pt supine in bed upon arrival. She is agreeable to PT and continues to repeatedly asks the same questions. Pt denies any pain or complaints at this time        Objective   Bed mobility  Supine to Sit: Moderate assistance;Maximum assistance  Sit to Supine: Maximum assistance;2 Person assistance (pt attempted to lay straight back horizontal in bed, max Ax2 to lay down safely)  Comment: Sat EOB for ~ 15 min with SBA-maxA. Initially shortly MaxA due to posterior leaning and intermittently taking her feet off of the ground.   Transfers  Sit to Stand: Contact guard assistance (to stedy)  Stand to sit: Contact guard assistance (from stedy to EOB)  Comment: Stood in stedy ~ 2 minutes SBA-CGA  Ambulation  Ambulation?: No         Comment: Pt set up to eat breakfast at EOB        AM-PAC Score  AM-PAC Inpatient Mobility Raw Score : 12 (12/28/21 1227)  AM-PAC Inpatient T-Scale Score : 35.33 (12/28/21 1227)  Mobility Inpatient CMS 0-100% Score: 68.66 (12/28/21 1227)  Mobility Inpatient CMS G-Code Modifier : CL (12/28/21 1227)          Goals  Short term goals  Time Frame for Short term goals: Upon d/c acute care setting. - all goals ongoing as of 12/27/21  Short term goal 1: Bed Mob CGA. Short term goal 2: Transfers with/without assist device CGA. Short term goal 3: Amb with/without assist device 25' CGA/MIn assist.  Patient Goals   Patient goals : None stated. Plan    Plan  Times per week: 3-5x/week  Current Treatment Recommendations: Strengthening,Functional Mobility Training,Transfer [employer] Training,Safety Education & Training,Patient/Caregiver Education & Training,Cognitive Reorientation,Neuromuscular Re-education,Balance Training,Endurance Training  Safety Devices  Type of devices: All fall risk precautions in place,Call light within reach,Gait belt,Patient at risk for falls,Left in bed,Bed alarm in place  Restraints  Initially in place: Yes  Restraints: UE Soft Restraints.      Therapy Time   Individual Concurrent Group Co-treatment   Time In 3170         Time Out 1115         Minutes 40               Electronically signed by Radha Aguilar, [place] [id number] on 12/28/2021 at 12:29 PM

## 2021-12-29 VITALS
DIASTOLIC BLOOD PRESSURE: 62 MMHG | TEMPERATURE: 98 F | SYSTOLIC BLOOD PRESSURE: 130 MMHG | BODY MASS INDEX: 20.89 KG/M2 | WEIGHT: 122.36 LBS | OXYGEN SATURATION: 95 % | HEIGHT: 64 IN | RESPIRATION RATE: 16 BRPM | HEART RATE: 60 BPM

## 2021-12-29 PROCEDURE — 2580000003 HC RX 258: Performed by: INTERNAL MEDICINE

## 2021-12-29 PROCEDURE — 97535 SELF CARE MNGMENT TRAINING: CPT

## 2021-12-29 PROCEDURE — 97530 THERAPEUTIC ACTIVITIES: CPT

## 2021-12-29 PROCEDURE — 6370000000 HC RX 637 (ALT 250 FOR IP): Performed by: INTERNAL MEDICINE

## 2021-12-29 PROCEDURE — 6360000002 HC RX W HCPCS: Performed by: INTERNAL MEDICINE

## 2021-12-29 RX ORDER — DEXAMETHASONE 6 MG/1
6 TABLET ORAL DAILY
Qty: 7 TABLET | Refills: 0 | Status: SHIPPED | OUTPATIENT
Start: 2021-12-30 | End: 2022-01-06

## 2021-12-29 RX ADMIN — LORAZEPAM 0.5 MG: 0.5 TABLET ORAL at 12:30

## 2021-12-29 RX ADMIN — SERTRALINE 50 MG: 50 TABLET, FILM COATED ORAL at 08:15

## 2021-12-29 RX ADMIN — ENOXAPARIN SODIUM 40 MG: 100 INJECTION SUBCUTANEOUS at 08:15

## 2021-12-29 RX ADMIN — LISINOPRIL 10 MG: 10 TABLET ORAL at 08:15

## 2021-12-29 RX ADMIN — METOPROLOL TARTRATE 25 MG: 25 TABLET, FILM COATED ORAL at 08:15

## 2021-12-29 RX ADMIN — SODIUM CHLORIDE, PRESERVATIVE FREE 10 ML: 5 INJECTION INTRAVENOUS at 08:16

## 2021-12-29 RX ADMIN — DEXAMETHASONE 6 MG: 6 TABLET ORAL at 08:15

## 2021-12-29 ASSESSMENT — PAIN SCALES - PAIN ASSESSMENT IN ADVANCED DEMENTIA (PAINAD)
NEGVOCALIZATION: 0
BREATHING: 0
CONSOLABILITY: 0
CONSOLABILITY: 0
FACIALEXPRESSION: 0
TOTALSCORE: 0
BREATHING: 0
FACIALEXPRESSION: 0
BREATHING: 0
TOTALSCORE: 0
NEGVOCALIZATION: 0
BODYLANGUAGE: 0
CONSOLABILITY: 0
TOTALSCORE: 0
FACIALEXPRESSION: 0
BODYLANGUAGE: 0
CONSOLABILITY: 0
TOTALSCORE: 0
CONSOLABILITY: 0
FACIALEXPRESSION: 0
BODYLANGUAGE: 0
FACIALEXPRESSION: 0
BREATHING: 0
FACIALEXPRESSION: 0
BREATHING: 0
CONSOLABILITY: 0
BODYLANGUAGE: 0
NEGVOCALIZATION: 0
CONSOLABILITY: 0
TOTALSCORE: 0
NEGVOCALIZATION: 0
CONSOLABILITY: 0
TOTALSCORE: 0
FACIALEXPRESSION: 0
BODYLANGUAGE: 0
TOTALSCORE: 0
NEGVOCALIZATION: 0
NEGVOCALIZATION: 0
BREATHING: 0
BREATHING: 0
TOTALSCORE: 0
BODYLANGUAGE: 0
BREATHING: 0
FACIALEXPRESSION: 0
BODYLANGUAGE: 0
CONSOLABILITY: 0
NEGVOCALIZATION: 0
NEGVOCALIZATION: 0
TOTALSCORE: 0
BREATHING: 0
FACIALEXPRESSION: 0
CONSOLABILITY: 0
NEGVOCALIZATION: 0
FACIALEXPRESSION: 0
NEGVOCALIZATION: 0
TOTALSCORE: 0
BREATHING: 0

## 2021-12-29 NOTE — PROGRESS NOTES
Physical Therapy  Facility/Department: [address] MED SURG  Daily Treatment Note  NAME: [facility]  : 1928  MRN: [id number]    Date of Service: 2021    Discharge Recommendations:  Patient would benefit from continued therapy after discharge,3-5 sessions per week   PT Equipment Recommendations  Other: Defer to facility. [facility] scored a 15/24 on the AM-PAC short mobility form. Current research shows that an AM-PAC score of 17 or less is typically not associated with a discharge to the patient's home setting. Based on the patient's AM-PAC score and their current functional mobility deficits, it is recommended that the patient have 3-5 sessions per week of Physical Therapy at d/c to increase the patient's independence. Please see assessment section for further patient specific details. If patient discharges prior to next session this note will serve as a discharge summary. Please see below for the latest assessment towards goals. Assessment   Body structures, Functions, Activity limitations: Decreased functional mobility ; Decreased safe awareness;Decreased cognition  Assessment: 81 y/o female admit from nursing facility with Syncope/Collapse, LETY; COVID +. PMH including Dementia. Per social work note, pt lives at [facility] ; unsure of most recent mobility level - however was living along in [facility] setting with assist from family in 2021 per Shannan. Pt currently functioning below baseline - signficantly limited by cognition. Recommend continued skilled therapy 3-5x/week to maximize independence and safety with functional mobility. Prognosis: Fair  Decision Making: Medium Complexity  PT Education: PT Role;Plan of Care;General Safety;Orientation; Functional Mobility Training  REQUIRES PT FOLLOW UP: Yes  Activity Tolerance  Activity Tolerance: Patient limited by cognitive status     Patient Diagnosis(es): There were no encounter diagnoses.      has no past medical history on file. has no past surgical history on file. Restrictions  Restrictions/Precautions  Restrictions/Precautions: Fall Risk,Isolation  Position Activity Restriction  Other position/activity restrictions: Droplet Plus : COVID +     Subjective   General  Chart Reviewed: Yes  Additional Pertinent Hx: 79 y/o female admit from nursing facility with Syncope/Collapse, LETY; COVID +. PMH including Dementia. Response To Previous Treatment: Patient unable to report, no changes reported from family or staff  Family / Caregiver Present: No  Referring Practitioner: Dr. Mary Jimenez: Pt is agreeable to PT          Orientation  Orientation  Orientation Level: Oriented to person;Disoriented to place; Disoriented to time;Disoriented to situation     Cognition   Cognition  Overall Cognitive Status: Exceptions  Arousal/Alertness: Appropriate responses to stimuli  Following Commands: Follows one step commands with repetition  Attention Span: Attends with cues to redirect  Memory: Decreased short term memory;Decreased long term memory;Decreased recall of recent events  Safety Judgement: Decreased awareness of need for safety;Decreased awareness of need for assistance  Problem Solving: Decreased awareness of errors  Insights: Not aware of deficits  Initiation: Requires cues for all  Sequencing: Requires cues for some     Objective   Bed mobility  Supine to Sit: Contact guard assistance  Sit to Supine: Stand by assistance  Scooting: Contact guard assistance (CGA to scoot to EOB, total Ax2 to scoot up in bed)  Transfers  Sit to Stand: Contact guard assistance (to RW)  Stand to sit: Contact guard assistance (to RW)  Ambulation  Ambulation?: Yes  Ambulation 1  Surface: level tile  Device: Rolling Walker  Assistance: Minimal assistance; Moderate assistance  Quality of Gait: multiple posterior LOB  Gait Deviations: Slow Kriss;Decreased step length;Decreased step height  Distance: 30'  Comments: Pt was able to stand at sink and wash face. Stairs/Curb  Stairs?: No     Balance  Posture: Good  Sitting - Static: Good  Sitting - Dynamic: Good  Standing - Static: Fair (@RW)  Standing - Dynamic: Poor;+ (@RW)           AM-PAC Score  AM-PAC Inpatient Mobility Raw Score : 15 (12/29/21 1314)  AM-PAC Inpatient T-Scale Score : 39.45 (12/29/21 1314)  Mobility Inpatient CMS 0-100% Score: 57.7 (12/29/21 1314)  Mobility Inpatient CMS G-Code Modifier : CK (12/29/21 1314)          Goals  Short term goals  Time Frame for Short term goals: Upon d/c acute care setting. - all goals ongoing as of 12/29/21  Short term goal 1: Bed Mob CGA. Short term goal 2: Transfers with/without assist device CGA. Short term goal 3: Amb with/without assist device 25' CGA/MIn assist.  Patient Goals   Patient goals : None stated. Plan    Plan  Times per week: 3-5x/week  Current Treatment Recommendations: Strengthening,Functional Mobility Training,Transfer [employer] Training,Safety Education & Training,Patient/Caregiver Education & Training,Cognitive Reorientation,Neuromuscular Re-education,Balance Training,Endurance Training  Safety Devices  Type of devices: All fall risk precautions in place,Call light within reach,Gait belt,Patient at risk for falls,Left in bed,Bed alarm in place  Restraints  Initially in place: Yes  Restraints: UE Soft Restraints.      Therapy Time   Individual Concurrent Group Co-treatment   Time In 1125         Time Out 1150         Minutes 25         Timed Code Treatment Minutes: 25 Minutes       Jay Maravilla, PT

## 2021-12-29 NOTE — PROGRESS NOTES
Called report to Corinne Howard at [place],  to be picked up at [facility].

## 2021-12-29 NOTE — DISCHARGE SUMMARY
Hospital Medicine Discharge Summary    Patient ID: Edson Cottrell      Patient's PCP: Liliana Monieney TORY    Admit Date: 12/25/2021     Discharge Date:   12/29/2021    Admitting Provider: Frances Ni MD     Discharge Provider: Hansa Barcenas MD     Discharge Diagnoses: Active Hospital Problems    Diagnosis     Syncope and collapse [R55]        The patient was seen and examined on day of discharge and this discharge summary is in conjunction with any daily progress note from day of discharge. Hospital Course:     From HPI:\"80year-old female with past medical history of dementia presented from a nursing facility with syncopal episode. Apparently patient was having a bowel movement on the toilet when she had a syncopal episode. She was found to be unconscious for a few minutes. Patient is unable to provide any history given her dementia. On arrival to the hospital patient was noted to be mildly hypotensive. Lab work showed elevated creatinine hyponatremia. Patient's Covid test came back positive. She had a CT abdomen and pelvis performed which showed no acute findings. Given her syncopal episode patient was admitted to the hospital for the work-up and management. \"      1.  Syncope and collapse, likely vasovagal reaction, telemetry monitor, monitor closely.  echo noted. Plan of care discussed with her daughter, findings from echo related to mitral regurgitation discussed in details. 2.  Acute kidney injury, can contribute to presentation, on IV fluid currently.  Creatinine improved. 3.  Mild elevated troponin, trend for now  4.  Nausea and vomiting, improved  5.  COVID-19 infection, monitor closely supportive measures apparently she was for very short term reading hypoxic in the ED but thereafter she was not in need of any oxygen, then she developed hypoxia and started on oxygen along with starting her on dexamethazone.    6.  Anemia, appears chronic, no signs of bleeding, follow-up as outpatient  7.  Mild hyponatremia, likely hypovolemic, improved. 8. Severe MR, follow up with cardiology as outpatient. Physical Exam Performed:     BP (!) 152/64   Pulse 68   Temp 97.9 °F (36.6 °C) (Oral)   Resp 16   Ht 5' 4\" (1.626 m)   Wt 122 lb 5.7 oz (55.5 kg)   SpO2 96%   BMI 21.00 kg/m²       General appearance:  No apparent distress  HEENT:  Normal cephalic  Neck: Supple  Respiratory:  Normal respiratory effort. Clear to auscultation, bilaterally without Rales/Wheezes/Rhonchi. Cardiovascular:  Regular rate and rhythm with normal S1/S2 without murmurs, rubs or gallops. Abdomen: Soft, non-tender, non-distended   Musculoskeletal:  No clubbing, cyanosis   Skin: Skin color, texture, turgor normal.  No rashes or lesions. Neurologic:  No focal weakness   Psychiatric:  Alert and oriented  Capillary Refill: Brisk,< 3 seconds   Peripheral Pulses: +2 palpable, equal bilaterally       Labs: For convenience and continuity at follow-up the following most recent labs are provided:      CBC:    Lab Results   Component Value Date    WBC 4.7 12/26/2021    HGB 9.7 12/26/2021    HCT 29.4 12/26/2021     12/26/2021       Renal:    Lab Results   Component Value Date     12/26/2021    K 4.1 12/26/2021     12/26/2021    CO2 24 12/26/2021    BUN 16 12/26/2021    CREATININE 0.9 12/26/2021    CALCIUM 8.6 12/26/2021         Significant Diagnostic Studies    Radiology:   CT ABDOMEN PELVIS WO CONTRAST Additional Contrast? None   Final Result   1. Increased fluid in the stomach ,small bowel and colon, correlate for acute   gastro enterocolitis. No abscess or free air noted. No bowel obstruction. 2. Multiple left renal simple cysts.       RECOMMENDATIONS:   Unavailable                Consults:     IP CONSULT TO HOSPITALIST    Disposition: ECF    Condition at Discharge: Stable    Discharge Instructions/Follow-up:  PCP, Cardiology     Code Status:  DNR-CC     Activity: activity as tolerated    Diet:

## 2021-12-29 NOTE — PLAN OF CARE
Problem: Airway Clearance - Ineffective  Goal: Achieve or maintain patent airway  12/29/2021 1036 by Valerie Reynaga RN  Outcome: Ongoing  12/29/2021 0052 by Faustino Allen RN  Outcome: Ongoing  12/29/2021 0051 by Faustino Allen RN  Outcome: Ongoing     Problem: Gas Exchange - Impaired  Goal: Absence of hypoxia  12/29/2021 1036 by Valerie Reynaga RN  Outcome: Ongoing  12/29/2021 0052 by Faustino Allen RN  Outcome: Ongoing  12/29/2021 0051 by Faustino Allen RN  Outcome: Ongoing  Goal: Promote optimal lung function  12/29/2021 1036 by Valerie Reynaga RN  Outcome: Ongoing  12/29/2021 0052 by Faustino Allen RN  Outcome: Ongoing  12/29/2021 0051 by Faustino Allen RN  Outcome: Ongoing     Problem: Breathing Pattern - Ineffective  Goal: Ability to achieve and maintain a regular respiratory rate  12/29/2021 1036 by Valerie Reynaga RN  Outcome: Ongoing  12/29/2021 0052 by Faustino Allen RN  Outcome: Ongoing  12/29/2021 0051 by Faustino Allen RN  Outcome: Ongoing     Problem: Isolation Precautions - Risk of Spread of Infection  Goal: Prevent transmission of infection  12/29/2021 1036 by Valerie Reynaga RN  Outcome: Ongoing  12/29/2021 0052 by Faustino Allen RN  Outcome: Ongoing  12/29/2021 0051 by Faustino Allen RN  Outcome: Ongoing     Problem: Nutrition Deficits  Goal: Optimize nutritional status  12/29/2021 1036 by Valerie Reynaga RN  Outcome: Ongoing  12/29/2021 0052 by Faustino Allen RN  Outcome: Ongoing  12/29/2021 0051 by Faustino Allen RN  Outcome: Ongoing     Problem: Risk for Fluid Volume Deficit  Goal: Maintain normal heart rhythm  12/29/2021 1036 by Valerie Reynaga RN  Outcome: Ongoing  12/29/2021 0052 by Faustino Allen RN  Outcome: Ongoing  12/29/2021 0051 by Faustino Allen RN  Outcome: Ongoing  Goal: Maintain absence of muscle cramping  12/29/2021 1036 by Valerie Reynaga Problem: Discharge Planning:  Goal: Ability to perform activities of daily living will improve  Description: Ability to perform activities of daily living will improve  12/29/2021 1036 by Yohana Gay RN  Outcome: Ongoing  12/29/2021 0052 by Ryan Mann RN  Outcome: Ongoing  12/29/2021 0051 by Ryan Mann RN  Outcome: Ongoing  Goal: Participates in care planning  Description: Participates in care planning  12/29/2021 1036 by Yohana Gay RN  Outcome: Ongoing  12/29/2021 0052 by Ryan Mann RN  Outcome: Ongoing  12/29/2021 0051 by Ryan Mann RN  Outcome: Ongoing     Problem: Injury - Risk of, Physical Injury:  Goal: Absence of physical injury  Description: Absence of physical injury  12/29/2021 1036 by Yohana Gay RN  Outcome: Ongoing  12/29/2021 0052 by Ryan Mann RN  Outcome: Ongoing  12/29/2021 0051 by Ryan Mann RN  Outcome: Ongoing  Goal: Will remain free from falls  Description: Will remain free from falls  12/29/2021 1036 by Yohana Gay RN  Outcome: Ongoing  12/29/2021 0052 by Ryan Mann RN  Outcome: Ongoing  12/29/2021 0051 by Ryan Mann RN  Outcome: Ongoing     Problem: Mood - Altered:  Goal: Mood stable  Description: Mood stable  12/29/2021 1036 by Yohana Gay RN  Outcome: Ongoing  12/29/2021 0052 by Ryan Mann RN  Outcome: Ongoing  12/29/2021 0051 by Ryan Mann RN  Outcome: Ongoing  Goal: Absence of abusive behavior  Description: Absence of abusive behavior  12/29/2021 1036 by Yohana Gay RN  Outcome: Ongoing  12/29/2021 0052 by Ryan Mann RN  Outcome: Ongoing  12/29/2021 0051 by Ryan Mann RN  Outcome: Ongoing  Goal: Verbalizations of feeling emotionally comfortable while being cared for will increase  Description: Verbalizations of feeling emotionally comfortable while being cared for will increase  12/29/2021 1036 by Becky Stallings RN  Outcome: Ongoing  12/29/2021 0052 by Apoorva Esparza RN  Outcome: Ongoing  12/29/2021 0051 by Apoorva Esparza RN  Outcome: Ongoing     Problem: Psychomotor Activity - Altered:  Goal: Absence of psychomotor disturbance signs and symptoms  Description: Absence of psychomotor disturbance signs and symptoms  12/29/2021 1036 by Becky Stallings RN  Outcome: Ongoing  12/29/2021 0052 by Apoorva Esparza RN  Outcome: Ongoing  12/29/2021 0051 by Apoorva Esparza RN  Outcome: Ongoing     Problem: Sensory Perception - Impaired:  Goal: Demonstrations of improved sensory functioning will increase  Description: Demonstrations of improved sensory functioning will increase  12/29/2021 1036 by Becky Stallings RN  Outcome: Ongoing  12/29/2021 0052 by Apoorva Esparza RN  Outcome: Ongoing  12/29/2021 0051 by Apoorva Esparza RN  Outcome: Ongoing  Goal: Decrease in sensory misperception frequency  Description: Decrease in sensory misperception frequency  12/29/2021 1036 by Becky Stallings RN  Outcome: Ongoing  12/29/2021 0052 by Apoorva Esparza RN  Outcome: Ongoing  12/29/2021 0051 by Apoorva Esparza RN  Outcome: Ongoing  Goal: Able to refrain from responding to false sensory perceptions  Description: Able to refrain from responding to false sensory perceptions  12/29/2021 1036 by Becky Stallings RN  Outcome: Ongoing  12/29/2021 0052 by Apoorva Esparza RN  Outcome: Ongoing  12/29/2021 0051 by Apoorva Esparza RN  Outcome: Ongoing  Goal: Demonstrates accurate environmental perceptions  Description: Demonstrates accurate environmental perceptions  12/29/2021 1036 by Becky Stallings RN  Outcome: Ongoing  12/29/2021 0052 by Apoorva Esparza RN  Outcome: Ongoing  12/29/2021 0051 by Apoorva Esparza RN  Outcome: Ongoing  Goal: Able to distinguish between reality-based and nonreality-based thinking  Description: Able to distinguish between reality-based and nonreality-based thinking  12/29/2021 1036 by Yohana Gay RN  Outcome: Ongoing  12/29/2021 0052 by Ryan Mann RN  Outcome: Ongoing  12/29/2021 0051 by Ryan Mann RN  Outcome: Ongoing  Goal: Able to interrupt nonreality-based thinking  Description: Able to interrupt nonreality-based thinking  12/29/2021 1036 by Yohana Gay RN  Outcome: Ongoing  12/29/2021 0052 by Ryan Mann RN  Outcome: Ongoing  12/29/2021 0051 by Ryan Mann RN  Outcome: Ongoing

## 2021-12-29 NOTE — PLAN OF CARE
Problem: Airway Clearance - Ineffective  Goal: Achieve or maintain patent airway  12/29/2021 0052 by Jordyn Romero RN  Outcome: Ongoing  12/29/2021 0051 by Jordyn Romero RN  Outcome: Ongoing     Problem: Gas Exchange - Impaired  Goal: Absence of hypoxia  12/29/2021 0052 by Jordyn Romero RN  Outcome: Ongoing  12/29/2021 0051 by Jordyn Romero RN  Outcome: Ongoing  12/28/2021 1454 by Sekou De Leon RN  Outcome: Ongoing  Goal: Promote optimal lung function  12/29/2021 0052 by Jordyn Romero RN  Outcome: Ongoing  12/29/2021 0051 by Jordyn Romero RN  Outcome: Ongoing     Problem: Breathing Pattern - Ineffective  Goal: Ability to achieve and maintain a regular respiratory rate  12/29/2021 0052 by Jordyn Romero RN  Outcome: Ongoing  12/29/2021 0051 by Jordyn Romero RN  Outcome: Ongoing     Problem:  Body Temperature -  Risk of, Imbalanced  Goal: Ability to maintain a body temperature within defined limits  12/29/2021 0052 by Jordyn Romero RN  Outcome: Ongoing  12/29/2021 0051 by Jordyn Romero RN  Outcome: Ongoing  Goal: Will regain or maintain usual level of consciousness  12/29/2021 0052 by Jordyn Romero RN  Outcome: Ongoing  12/29/2021 0051 by Jordyn Romero RN  Outcome: Ongoing  Goal: Complications related to the disease process, condition or treatment will be avoided or minimized  12/29/2021 0052 by Jordyn Romero RN  Outcome: Ongoing  12/29/2021 0051 by Jordyn Romero RN  Outcome: Ongoing     Problem: Isolation Precautions - Risk of Spread of Infection  Goal: Prevent transmission of infection  12/29/2021 0052 by Jordyn oRmero RN  Outcome: Ongoing  12/29/2021 0051 by Jordyn Romero RN  Outcome: Ongoing     Problem: Nutrition Deficits  Goal: Optimize nutritional status  12/29/2021 0052 by Jordyn Romero RN  Outcome: Ongoing  12/29/2021 0051 by Jordyn Romero RN  Outcome: Ongoing     Problem: Risk for Fluid Volume Deficit  Goal: Maintain normal heart rhythm  12/29/2021 0052 by Julian Betts RN  Outcome: Ongoing  12/29/2021 0051 by Julian Betts RN  Outcome: Ongoing  Goal: Maintain absence of muscle cramping  12/29/2021 0052 by Julian Betts RN  Outcome: Ongoing  12/29/2021 0051 by Julian Betts RN  Outcome: Ongoing  Goal: Maintain normal serum potassium, sodium, calcium, phosphorus, and pH  12/29/2021 0052 by Julian Betts RN  Outcome: Ongoing  12/29/2021 0051 by Julian Betts RN  Outcome: Ongoing     Problem: Loneliness or Risk for Loneliness  Goal: Demonstrate positive use of time alone when socialization is not possible  12/29/2021 0052 by Julian Betts RN  Outcome: Ongoing  12/29/2021 0051 by Julian Betts RN  Outcome: Ongoing     Problem: Fatigue  Goal: Verbalize increase energy and improved vitality  12/29/2021 0052 by Julian Betts RN  Outcome: Ongoing  12/29/2021 0051 by Julian Betts RN  Outcome: Ongoing     Problem: Patient Education: Go to Patient Education Activity  Goal: Patient/Family Education  12/29/2021 0052 by Julian Betts RN  Outcome: Ongoing  12/29/2021 0051 by Julian Betts RN  Outcome: Ongoing     Problem: Confusion - Acute:  Goal: Absence of continued neurological deterioration signs and symptoms  Description: Absence of continued neurological deterioration signs and symptoms  12/29/2021 0052 by Julian Betts RN  Outcome: Ongoing  12/29/2021 0051 by Julian Betts RN  Outcome: Ongoing  Goal: Mental status will be restored to baseline  Description: Mental status will be restored to baseline  12/29/2021 0052 by Julian Betts RN  Outcome: Ongoing  12/29/2021 0051 by Julian Betts RN  Outcome: Ongoing     Problem: Discharge Planning:  Goal: Ability to perform activities of daily living will improve  Description: Ability to perform activities of daily living will improve  12/29/2021 5045 by Carroll Brooks RN  Outcome: Ongoing  12/29/2021 0051 by Carroll Brooks RN  Outcome: Ongoing  Goal: Participates in care planning  Description: Participates in care planning  12/29/2021 0052 by Carroll Brooks RN  Outcome: Ongoing  12/29/2021 0051 by Carroll Brooks RN  Outcome: Ongoing     Problem: Injury - Risk of, Physical Injury:  Goal: Absence of physical injury  Description: Absence of physical injury  12/29/2021 0052 by Carroll Brooks RN  Outcome: Ongoing  12/29/2021 0051 by Carroll Brooks RN  Outcome: Ongoing  Goal: Will remain free from falls  Description: Will remain free from falls  12/29/2021 0052 by Carroll Brooks RN  Outcome: Ongoing  12/29/2021 0051 by Carroll Brooks RN  Outcome: Ongoing     Problem: Mood - Altered:  Goal: Mood stable  Description: Mood stable  12/29/2021 0052 by Carroll Brooks RN  Outcome: Ongoing  12/29/2021 0051 by Carroll Brooks RN  Outcome: Ongoing  Goal: Absence of abusive behavior  Description: Absence of abusive behavior  12/29/2021 0052 by Carroll Brooks RN  Outcome: Ongoing  12/29/2021 0051 by Carroll Brooks RN  Outcome: Ongoing  Goal: Verbalizations of feeling emotionally comfortable while being cared for will increase  Description: Verbalizations of feeling emotionally comfortable while being cared for will increase  12/29/2021 0052 by Carroll Brooks RN  Outcome: Ongoing  12/29/2021 0051 by Carroll Brooks RN  Outcome: Ongoing     Problem: Psychomotor Activity - Altered:  Goal: Absence of psychomotor disturbance signs and symptoms  Description: Absence of psychomotor disturbance signs and symptoms  12/29/2021 0052 by Carroll Brooks RN  Outcome: Ongoing  12/29/2021 0051 by Carroll Brooks RN  Outcome: Ongoing     Problem: Sensory Perception - Impaired:  Goal: Demonstrations of improved sensory functioning will increase  Description: Demonstrations of improved sensory Absence of new skin breakdown  12/29/2021 0052 by Ryan Mann RN  Outcome: Ongoing  12/29/2021 0051 by Ryan Mann RN  Outcome: Ongoing     Problem: Falls - Risk of:  Goal: Absence of physical injury  Description: Absence of physical injury  12/29/2021 0052 by Ryan Mann RN  Outcome: Ongoing  12/29/2021 0051 by Ryan Mann RN  Outcome: Ongoing  Goal: Will remain free from falls  Description: Will remain free from falls  12/29/2021 0052 by Ryan Mann RN  Outcome: Ongoing  12/29/2021 0051 by Ryan Mann RN  Outcome: Ongoing     Problem: Non-Violent Restraints  Goal: Removal from restraints as soon as assessed to be safe  12/29/2021 0052 by Ryan Mann RN  Outcome: Ongoing  12/29/2021 0051 by Ryan Mann RN  Outcome: Ongoing  Goal: No harm/injury to patient while restraints in use  12/29/2021 0052 by Ryan Mann RN  Outcome: Ongoing  12/29/2021 0051 by Ryan Mann RN  Outcome: Ongoing  Goal: Patient's dignity will be maintained  12/29/2021 0052 by Ryan Mann RN  Outcome: Ongoing  12/29/2021 0051 by Ryan Mann RN  Outcome: Ongoing     Problem: Nutrition  Goal: Optimal nutrition therapy  12/29/2021 0052 by Ryan Mann RN  Outcome: Ongoing  12/29/2021 0051 by Ryan Mann RN  Outcome: Ongoing

## 2021-12-29 NOTE — PROGRESS NOTES
Occupational Therapy  Facility/Department: [address] MED SURG  Daily Treatment Note  NAME: Arminda Burkitt  : 1928  MRN: [id number]    Date of Service: 2021    Discharge Recommendations:  3-5 sessions per week,Patient would benefit from continued therapy after discharge     Arminda Burkitt scored a 14/24 on the AM-PAC ADL Inpatient form. Current research shows that an AM-PAC score of 17 or less is typically not associated with a discharge to the patient's home setting. Based on the patient's AM-PAC score and their current ADL deficits, it is recommended that the patient have 3-5 sessions per week of Occupational Therapy at d/c to increase the patient's independence. Please see assessment section for further patient specific details. If patient discharges prior to next session this note will serve as a discharge summary. Please see below for the latest assessment towards goals. Assessment   Performance deficits / Impairments: Decreased functional mobility ; Decreased ADL status; Decreased safe awareness;Decreased cognition;Decreased balance  Assessment: Pt is a 79 yo F admitted with syncope and collapse, positive for covid-19. PTA, pt is from a nursing facility and unable to provide any PLOF. Per  note, pt is a resident of [address] [place] [address] but full PLOF is unknown. This date, pt completed bed mobility w/ CGA, fxl transfers w/ min A, and fxl mobility w/ up to min/mod Ax2 using RW. She stood at the sink and performed simple grooming w/ CGA/min A for balance. Pt is a high fall risk and demonstrates need for ongoing skilled therapy 3-5 times/week at d/c to increase her safety and independence.   Treatment Diagnosis: Decreased: ADLs, fxl transfers, fxl mobility, cognition  Prognosis: Fair  OT Education: OT Role;Plan of Care;Transfer Training;ADL Adaptive Strategies;Orientation  REQUIRES OT FOLLOW UP: Yes  Activity Tolerance  Activity Tolerance: Treatment limited secondary to decreased cognition  Safety Devices  Safety Devices in place: Yes  Type of devices: Call light within reach;Nurse notified; Patient at risk for falls; Bed alarm in place; Left in bed;Gait belt  Restraints  Initially in place: Yes  Restraints: bilat wrist restraints re-applied at end of session         Patient Diagnosis(es): There were no encounter diagnoses. has no past medical history on file. has no past surgical history on file. Restrictions  Restrictions/Precautions  Restrictions/Precautions: Fall Risk,Isolation  Position Activity Restriction  Other position/activity restrictions: Droplet Plus : COVID +  Subjective   General  Chart Reviewed: Yes  Patient assessed for rehabilitation services?: Yes  Additional Pertinent Hx: per H&P: \"80year-old female with past medical history of dementia presented from a nursing facility with syncopal episode. Apparently patient was having a bowel movement on the toilet when she had a syncopal episode. She was found to be unconscious for a few minutes. Patient is unable to provide any history given her dementia. On arrival to the hospital patient was noted to be mildly hypotensive. Lab work showed elevated creatinine hyponatremia. Patient's Covid test came back positive. She had a CT abdomen and pelvis performed which showed no acute findings. Given her syncopal episode patient was admitted to the hospital for the work-up and management. \"  Family / Caregiver Present: No  Referring Practitioner: Amado Bliss MD  Diagnosis: Syncopal episode, covid-19  Subjective  Subjective: Pt met b/s for OT cotx w/ PT. Pt in bed w/ bilat wrist restraints tied but legs dangling off the side, confused but agreeable. Denied pain      Orientation  Orientation  Overall Orientation Status: Impaired  Orientation Level: Oriented to person;Disoriented to place; Disoriented to time;Disoriented to situation (does not know where she is, but does later ask when she will be getting out of here)  Objective ADL  Grooming: Contact guard assistance (Pt washed her face standing at the sink w/ CGA/min A for balance)        Balance  Sitting Balance: Stand by assistance  Standing Balance: Minimal assistance (min Ax2 @ RW)  Functional Mobility  Functional - Mobility Device: Rolling Walker  Activity: To/from bathroom  Assist Level: Minimal assistance  Functional Mobility Comments: Pt completed fxl mobility from bed <> bathroom w/ CGA initially but up to min/mod Ax2 by the end d/t fatigue and reported feeling \"wobbly\"  Bed mobility  Supine to Sit: Contact guard assistance  Sit to Supine: Stand by assistance  Scooting: Contact guard assistance (CGA to scoot to EOB, total Ax2 to scoot up in bed)  Transfers  Sit to stand: Contact guard assistance  Stand to sit: Minimal assistance  Transfer Comments: RW; cues for safety and hand placement                       Cognition  Overall Cognitive Status: Exceptions  Arousal/Alertness: Appropriate responses to stimuli  Following Commands:  Follows one step commands with repetition  Attention Span: Attends with cues to redirect  Memory: Decreased short term memory;Decreased long term memory;Decreased recall of recent events  Safety Judgement: Decreased awareness of need for safety;Decreased awareness of need for assistance  Problem Solving: Decreased awareness of errors  Insights: Not aware of deficits  Initiation: Requires cues for all  Sequencing: Requires cues for some              Plan   Plan  Times per week: 2-3  Times per day: Daily  Current Treatment Recommendations: Strengthening,Endurance Training,ROM,Balance Training,Functional Mobility Training,Safety Education & Training,Equipment Evaluation, Education, & procurement,Patient/Caregiver Education & Training,Self-Care / ADL    AM-PAC Score  AM-[facility] Inpatient Daily Activity Raw Score: 14 (12/29/21 1156)  AM-PAC Inpatient ADL T-Scale Score : 33.39 (12/29/21 1156)  ADL Inpatient CMS 0-100% Score: 59.67 (12/29/21 1156)  ADL Inpatient CMS G-Code Modifier : CK (12/29/21 2806)    Goals  Short term goals  Time Frame for Short term goals: Prior to d/c  Short term goal 1: Pt will complete bed mobility w/ min Ax2 -MET  Short term goal 2: Pt will complete fxl transfers w/ min Ax2 using LRAD -MET, progress to CGA  Short term goal 3: Pt will toilet w/ mod A  Short term goal 4: Pt will bathe w/ mod A  Short term goal 5: Pt will feed and groom mod I  Long term goals  Time Frame for Long term goals : LTG=STG  Patient Goals   Patient goals : pt unable to state d/t cognition       Therapy Time   Individual Concurrent Group Co-treatment   Time In 1120         Time Out 1150         Minutes [address] [place] [address]  [place] [address]

## 2021-12-29 NOTE — DISCHARGE INSTR - COC
Continuity of Care Form    Patient Name: Cheri Francois   :  1928  MRN:  [id number]    Admit date:  2021  Discharge date:  21    Code Status Order: DNR-CC   Advance Directives:      Admitting Physician:  Juventino Parker MD  PCP: Leobardo SPEARS    Discharging Nurse: Elizabeth Rocha [facility] Unit/Room#: X6Y-5553/9624-23  Discharging Unit Phone Number: [phone]    Emergency Contact:   Extended Emergency Contact Information  Primary Emergency Contact: Cristian Fernando, [address] Phone: [phone]  Relation: Child  Preferred language: English   needed? No  Secondary Emergency Contact: Ambreen Elias  Address: [address] [place] [address] Phone: [phone]  Mobile Phone: [phone]  Relation: Child    Past Surgical History:  No past surgical history on file.     Immunization History:   Immunization History   Administered Date(s) Administered    COVID-19, Pfizer, PF, 30mcg/0.3mL 2021, 2021       Active Problems:  Patient Active Problem List   Diagnosis Code    Syncope and collapse R55       Isolation/Infection:   Isolation            Droplet Plus          Patient Infection Status       Infection Onset Added Last Indicated Last Indicated By Review Planned Expiration Resolved Resolved By    COVID-19 21 COVID-19 22      Resolved    COVID-19 (Rule Out) 21 COVID-19 (Ordered)   21 Rule-Out Test Resulted    C-diff Rule Out 21 Clostridium difficile toxin/antigen (Ordered)   21 Tracey Carmona RN    COVID-19 (Rule Out) 21 COVID-19, Rapid (Ordered)   21 Rule-Out Test Resulted            Nurse Assessment:  Last Vital Signs: BP (!) 152/64   Pulse 68   Temp 97.9 °F (36.6 °C) (Oral)   Resp 16   Ht 5' 4\" (1.626 m)   Wt 122 lb 5.7 oz (55.5 kg)   SpO2 96%   BMI 21.00 kg/m²     Last documented pain score (0-10 scale): Pain Level: 0  Last Weight:   Wt Readings from Last 1 Encounters:   12/29/21 122 lb 5.7 oz (55.5 kg)     Mental Status:  disoriented and alert    IV Access:  - None    Nursing Mobility/ADLs:  Walking   Dependent  Transfer  Dependent  Bathing  Dependent  Dressing  Dependent  Toileting  Dependent  Feeding  Assisted  Med Admin  Assisted  Med Delivery   whole    Wound Care Documentation and Therapy:        Elimination:  Continence: Bowel: No  Bladder: No  Urinary Catheter: None   Colostomy/Ileostomy/Ileal Conduit: No       Date of Last BM: 12/29/21    Intake/Output Summary (Last 24 hours) at 12/29/2021 1157  Last data filed at 12/29/2021 0814  Gross per 24 hour   Intake 240 ml   Output --   Net 240 ml     No intake/output data recorded. Safety Concerns:     Sundowners Sundrome, History of Falls (last 30 days), and At Risk for Falls    Impairments/Disabilities:      dementia    Nutrition Therapy:  Current Nutrition Therapy:   - Oral Diet:  General    Routes of Feeding: Oral  Liquids: No Restrictions  Daily Fluid Restriction: no  Last Modified Barium Swallow with Video (Video Swallowing Test): not done    Treatments at the Time of Hospital Discharge:   Respiratory Treatments: none  Oxygen Therapy:  is not on home oxygen therapy.   Ventilator:    - No ventilator support    Rehab Therapies: Physical Therapy and Occupational Therapy  Weight Bearing Status/Restrictions: No weight bearing restirctions  Other Medical Equipment (for information only, NOT a DME order):  none  Other Treatments: none    Patient's personal belongings (please select all that are sent with patient):  Hearing aids    RN SIGNATURE:  Electronically signed by Valerie Reynaga RN on 12/29/21 at 2:03 PM EST    CASE MANAGEMENT/SOCIAL WORK SECTION    Inpatient Status Date: ***    Readmission Risk Assessment Score:  Readmission Risk              Risk of Unplanned Readmission:  11           Discharging to Facility/ Agency   Name: [facility]  Address: [address]  Phone: [phone]      / signature: Electronically signed by Valarie Jimenez RN on 12/29/21 at 12:52 PM EST    PHYSICIAN SECTION    Prognosis: Good    Condition at Discharge: Stable    Rehab Potential (if transferring to Rehab): Good    Recommended Labs or Other Treatments After Discharge: PT, OT, follow up with PCP as outpatient. Follow up with cardiology in 2 weeks    Physician Certification: I certify the above information and transfer of Nawaf Del Castillo  is necessary for the continuing treatment of the diagnosis listed and that she requires Intermediate/Mental Retardation/Developmental Disabilities Care for greater 30 days.      Update Admission H&P: No change in H&P    PHYSICIAN SIGNATURE:  Electronically signed by Carmelita Bah MD on 12/29/21 at 11:58 AM EST

## 2021-12-29 NOTE — CARE COORDINATION
CASE MANAGEMENT DISCHARGE SUMMARY:    DISCHARGE DATE: 12/29/21    DISCHARGED TO: [facility]    Covid test date: 12/25/21   Results: positive               REPORT NUMBER: [phone]               FAX NUMBER: [phone]    TRANSPORTATION: [facility]             TIME: 1700    INSURANCE PRECERT OBTAINED: not needed    HENS/PASAAR COMPLETED: not needed    Family, nurse & facility notified of pickup time.     Ronel Quinonez RN, BSN, Case Management  [phone]    Electronically signed by Ronel Quinonez RN on 12/29/2021 at 12:53 PM

## 2022-01-01 NOTE — PROGRESS NOTES
Physician Progress Note      Jaimie Marroquin  CSN #:                  [id number]  :                       1928  ADMIT DATE:       2021 2:08 PM  [facility] [place] DATE:        2021 5:39 PM  RESPONDING  PROVIDER #:        Soraida Gonzalez MD          QUERY TEXT:    Patient admitted with syncope. noted to have LETY and hypovolemia. If possible,   please document in progress notes and discharge summary if you are evaluating   and/or treating any of the following: The medical record reflects the following:  Risk Factors: N/V LETY Hypovolemia  Clinical Indicators: on admit BP  81/68   MAP 60-63 , Na 131,  Cr 1.4/gfr 35  Treatment: In ED 250ml NS Bolus then IVFs NS @ 50ml/hr.  and monitoring  Options provided:  -- Syncope due to LETY and hypovolemia  -- Syncope due to other hypotension  -- Other - I will add my own diagnosis  -- Disagree - Not applicable / Not valid  -- Disagree - Clinically unable to determine / Unknown  -- Refer to Clinical Documentation Reviewer    PROVIDER RESPONSE TEXT:    Vasovagal syncope    Query created by: Catalino Ceballos on 2021 7:23 AM      Electronically signed by:  Soraida Gonzalez MD 2022 12:49 PM

## 2022-01-03 ASSESSMENT — ENCOUNTER SYMPTOMS
ABDOMINAL PAIN: 0
SHORTNESS OF BREATH: 0